# Patient Record
Sex: MALE | Race: WHITE | NOT HISPANIC OR LATINO | Employment: FULL TIME | URBAN - METROPOLITAN AREA
[De-identification: names, ages, dates, MRNs, and addresses within clinical notes are randomized per-mention and may not be internally consistent; named-entity substitution may affect disease eponyms.]

---

## 2017-03-16 ENCOUNTER — OFFICE VISIT (OUTPATIENT)
Dept: FAMILY MEDICINE CLINIC | Facility: CLINIC | Age: 27
End: 2017-03-16

## 2017-03-16 VITALS
TEMPERATURE: 98.8 F | BODY MASS INDEX: 27.78 KG/M2 | HEART RATE: 76 BPM | DIASTOLIC BLOOD PRESSURE: 75 MMHG | HEIGHT: 67 IN | RESPIRATION RATE: 16 BRPM | SYSTOLIC BLOOD PRESSURE: 129 MMHG | WEIGHT: 177 LBS

## 2017-03-16 DIAGNOSIS — Z83.79 FAMILY HISTORY OF ULCERATIVE COLITIS: ICD-10-CM

## 2017-03-16 DIAGNOSIS — Z87.19 HISTORY OF RECTAL BLEEDING: ICD-10-CM

## 2017-03-16 DIAGNOSIS — R10.11 RIGHT UPPER QUADRANT ABDOMINAL PAIN: Primary | ICD-10-CM

## 2017-03-16 DIAGNOSIS — Z13.6 SCREENING FOR ISCHEMIC HEART DISEASE: ICD-10-CM

## 2017-03-16 PROCEDURE — 99214 OFFICE O/P EST MOD 30 MIN: CPT | Performed by: FAMILY MEDICINE

## 2017-03-16 NOTE — PROGRESS NOTES
"Chief Complaint   Patient presents with   • GI Problem   • Hemorrhoids       Subjective   Patient has bad feelings 20 minutes after eating.  Pain occurs along the right upper abdominal quadrant after eating. Pain is like a poking and can be intense at times.  Almost any food causes this symptom.  This symptom has been present for the last year on an intermittent basis.  Symptoms have been worse over the past month.    He also has been having intermittent episodes of bleeding from presumed hemorrhoids.  Referral to gastroenterology is indicated.    Review of Systems   Constitutional: Negative for unexpected weight change.        Chews ice   Gastrointestinal:        See hpi       Objective   Visit Vitals   • /75   • Pulse 76   • Temp 98.8 °F (37.1 °C) (Oral)   • Resp 16   • Ht 66.5\" (168.9 cm)   • Wt 177 lb (80.3 kg)   • BMI 28.14 kg/m2     Body mass index is 28.14 kg/(m^2).  Physical Exam   Constitutional: Vital signs are normal. He appears well-developed. No distress.   Abdominal: Soft. Normal appearance and bowel sounds are normal. There is no hepatosplenomegaly. There is tenderness (mild) in the right upper quadrant. There is no rigidity, no rebound and no guarding.   Psychiatric: He has a normal mood and affect. His behavior is normal. He is attentive.       Assessment/Plan     Problem List Items Addressed This Visit        Nervous and Auditory    Right upper quadrant abdominal pain - Primary    Relevant Orders    Comprehensive metabolic panel    CBC and Differential    TSH    US Gallbladder       Other    History of rectal bleeding    Relevant Orders    Ambulatory Referral to Gastroenterology    Family history of ulcerative colitis    Relevant Orders    Ambulatory Referral to Gastroenterology      Other Visit Diagnoses     Screening for ischemic heart disease        Relevant Orders    Lipid Panel With LDL/HDL Ratio          No outpatient encounter prescriptions on file as of 3/16/2017.     No " facility-administered encounter medications on file as of 3/16/2017.        Orders Placed This Encounter   Procedures   • US Gallbladder     Standing Status:   Future     Standing Expiration Date:   9/12/2017     Order Specific Question:   Reason for Exam:     Answer:   intermittant nausea and lightheadedness   • Comprehensive metabolic panel   • Lipid Panel With LDL/HDL Ratio   • TSH   • Ambulatory Referral to Gastroenterology     Referral Priority:   Routine     Referral Type:   Consultation     Referral Reason:   Specialty Services Required     Referred to Provider:   Roverto Valdez MD     Requested Specialty:   Gastroenterology     Number of Visits Requested:   1       Continue with current treatment plan.         RTC as needed or sooner if symptoms worsen or change

## 2017-03-20 LAB
ALBUMIN SERPL-MCNC: 4.6 G/DL (ref 3.5–5.2)
ALBUMIN/GLOB SERPL: 2.1 G/DL
ALP SERPL-CCNC: 141 U/L (ref 39–117)
ALT SERPL-CCNC: 18 U/L (ref 1–41)
AST SERPL-CCNC: 17 U/L (ref 1–40)
BASOPHILS # BLD AUTO: 0.1 10*3/MM3 (ref 0–0.2)
BASOPHILS NFR BLD AUTO: 1.5 % (ref 0–1.5)
BILIRUB SERPL-MCNC: 0.3 MG/DL (ref 0.1–1.2)
BUN SERPL-MCNC: 12 MG/DL (ref 6–20)
BUN/CREAT SERPL: 13.5 (ref 7–25)
CALCIUM SERPL-MCNC: 9.4 MG/DL (ref 8.6–10.5)
CHLORIDE SERPL-SCNC: 102 MMOL/L (ref 98–107)
CHOLEST SERPL-MCNC: 126 MG/DL (ref 0–200)
CO2 SERPL-SCNC: 25.4 MMOL/L (ref 22–29)
CREAT SERPL-MCNC: 0.89 MG/DL (ref 0.76–1.27)
DIFFERENTIAL COMMENT: NORMAL
EOSINOPHIL # BLD AUTO: 0.25 10*3/MM3 (ref 0–0.7)
EOSINOPHIL NFR BLD AUTO: 3.8 % (ref 0.3–6.2)
ERYTHROCYTE [DISTWIDTH] IN BLOOD BY AUTOMATED COUNT: 19.2 % (ref 11.5–14.5)
GLOBULIN SER CALC-MCNC: 2.2 GM/DL
GLUCOSE SERPL-MCNC: 90 MG/DL (ref 65–99)
HCT VFR BLD AUTO: 25.6 % (ref 40.4–52.2)
HDLC SERPL-MCNC: 34 MG/DL (ref 40–60)
HGB BLD-MCNC: 7 G/DL (ref 13.7–17.6)
IMM GRANULOCYTES # BLD: 0.02 10*3/MM3 (ref 0–0.03)
IMM GRANULOCYTES NFR BLD: 0.3 % (ref 0–0.5)
LDLC SERPL CALC-MCNC: 72 MG/DL (ref 0–100)
LDLC/HDLC SERPL: 2.11 {RATIO}
LYMPHOCYTES # BLD AUTO: 1.67 10*3/MM3 (ref 0.9–4.8)
LYMPHOCYTES NFR BLD AUTO: 25.3 % (ref 19.6–45.3)
MCH RBC QN AUTO: 16.5 PG (ref 27–32.7)
MCHC RBC AUTO-ENTMCNC: 27.3 G/DL (ref 32.6–36.4)
MCV RBC AUTO: 60.5 FL (ref 79.8–96.2)
MONOCYTES # BLD AUTO: 0.47 10*3/MM3 (ref 0.2–1.2)
MONOCYTES NFR BLD AUTO: 7.1 % (ref 5–12)
NEUTROPHILS # BLD AUTO: 4.08 10*3/MM3 (ref 1.9–8.1)
NEUTROPHILS NFR BLD AUTO: 62 % (ref 42.7–76)
PLATELET # BLD AUTO: 477 10*3/MM3 (ref 140–500)
PLATELET BLD QL SMEAR: NORMAL
POTASSIUM SERPL-SCNC: 4.4 MMOL/L (ref 3.5–5.2)
PROT SERPL-MCNC: 6.8 G/DL (ref 6–8.5)
RBC # BLD AUTO: 4.23 10*6/MM3 (ref 4.6–6)
RBC MORPH BLD: NORMAL
SODIUM SERPL-SCNC: 141 MMOL/L (ref 136–145)
TRIGL SERPL-MCNC: 101 MG/DL (ref 0–150)
TSH SERPL DL<=0.005 MIU/L-ACNC: 3.04 MIU/ML (ref 0.27–4.2)
VLDLC SERPL CALC-MCNC: 20.2 MG/DL (ref 5–40)
WBC # BLD AUTO: 6.59 10*3/MM3 (ref 4.5–10.7)

## 2017-03-21 NOTE — PROGRESS NOTES
Please call the patient regarding his abnormal result. He has severe anemia. Call him and tell him to get on over to the ER today for recheck of this lab. See if we can get him into the gastroenterolgist on urgent basis.

## 2017-03-22 ENCOUNTER — APPOINTMENT (OUTPATIENT)
Dept: CT IMAGING | Facility: HOSPITAL | Age: 27
End: 2017-03-22

## 2017-03-22 ENCOUNTER — HOSPITAL ENCOUNTER (INPATIENT)
Facility: HOSPITAL | Age: 27
LOS: 6 days | Discharge: HOME | End: 2017-03-28
Attending: EMERGENCY MEDICINE | Admitting: HOSPITALIST

## 2017-03-22 DIAGNOSIS — Z83.79 FAMILY HISTORY OF ULCERATIVE COLITIS: ICD-10-CM

## 2017-03-22 DIAGNOSIS — K92.2 LOWER GI BLEED: ICD-10-CM

## 2017-03-22 DIAGNOSIS — D62 ACUTE BLOOD LOSS ANEMIA: ICD-10-CM

## 2017-03-22 DIAGNOSIS — D50.0 IRON DEFICIENCY ANEMIA DUE TO CHRONIC BLOOD LOSS: ICD-10-CM

## 2017-03-22 DIAGNOSIS — K62.5 RECTAL BLEEDING: Primary | ICD-10-CM

## 2017-03-22 LAB
ABO GROUP BLD: NORMAL
ALBUMIN SERPL-MCNC: 4.6 G/DL (ref 3.5–5.2)
ALBUMIN/GLOB SERPL: 1.8 G/DL
ALP SERPL-CCNC: 132 U/L (ref 39–117)
ALT SERPL W P-5'-P-CCNC: 19 U/L (ref 1–41)
ANION GAP SERPL CALCULATED.3IONS-SCNC: 11.8 MMOL/L
ANISOCYTOSIS BLD QL: NORMAL
AST SERPL-CCNC: 19 U/L (ref 1–40)
BASOPHILS # BLD AUTO: 0.07 10*3/MM3 (ref 0–0.2)
BASOPHILS NFR BLD AUTO: 1.1 % (ref 0–1.5)
BILIRUB SERPL-MCNC: 0.3 MG/DL (ref 0.1–1.2)
BLD GP AB SCN SERPL QL: NEGATIVE
BUN BLD-MCNC: 12 MG/DL (ref 6–20)
BUN/CREAT SERPL: 13.6 (ref 7–25)
CALCIUM SPEC-SCNC: 9.1 MG/DL (ref 8.6–10.5)
CHLORIDE SERPL-SCNC: 104 MMOL/L (ref 98–107)
CO2 SERPL-SCNC: 25.2 MMOL/L (ref 22–29)
CREAT BLD-MCNC: 0.88 MG/DL (ref 0.76–1.27)
CRP SERPL-MCNC: 0.54 MG/DL (ref 0–0.5)
D-LACTATE SERPL-SCNC: 0.9 MMOL/L (ref 0.5–2)
DACRYOCYTES BLD QL SMEAR: NORMAL
DEPRECATED RDW RBC AUTO: 39.5 FL (ref 37–54)
EOSINOPHIL # BLD AUTO: 0.24 10*3/MM3 (ref 0–0.7)
EOSINOPHIL NFR BLD AUTO: 3.7 % (ref 0.3–6.2)
ERYTHROCYTE [DISTWIDTH] IN BLOOD BY AUTOMATED COUNT: 18.7 % (ref 11.5–14.5)
ERYTHROCYTE [SEDIMENTATION RATE] IN BLOOD: 10 MM/HR (ref 0–15)
FERRITIN SERPL-MCNC: 2.15 NG/ML (ref 30–400)
GFR SERPL CREATININE-BSD FRML MDRD: 105 ML/MIN/1.73
GGT SERPL-CCNC: 15 U/L (ref 8–61)
GLOBULIN UR ELPH-MCNC: 2.5 GM/DL
GLUCOSE BLD-MCNC: 96 MG/DL (ref 65–99)
HCT VFR BLD AUTO: 25.8 % (ref 40.4–52.2)
HGB BLD-MCNC: 7.2 G/DL (ref 13.7–17.6)
HOLD SPECIMEN: NORMAL
HOLD SPECIMEN: NORMAL
HYPOCHROMIA BLD QL: NORMAL
IMM GRANULOCYTES # BLD: 0 10*3/MM3 (ref 0–0.03)
IMM GRANULOCYTES NFR BLD: 0 % (ref 0–0.5)
INR PPP: 1.14 (ref 0.9–1.1)
IRON 24H UR-MRATE: 9 MCG/DL (ref 59–158)
IRON SATN MFR SERPL: 2 % (ref 20–50)
LYMPHOCYTES # BLD AUTO: 1.9 10*3/MM3 (ref 0.9–4.8)
LYMPHOCYTES NFR BLD AUTO: 29.2 % (ref 19.6–45.3)
MCH RBC QN AUTO: 16.5 PG (ref 27–32.7)
MCHC RBC AUTO-ENTMCNC: 27.9 G/DL (ref 32.6–36.4)
MCV RBC AUTO: 59 FL (ref 79.8–96.2)
MICROCYTES BLD QL: NORMAL
MONOCYTES # BLD AUTO: 0.39 10*3/MM3 (ref 0.2–1.2)
MONOCYTES NFR BLD AUTO: 6 % (ref 5–12)
NEUTROPHILS # BLD AUTO: 3.91 10*3/MM3 (ref 1.9–8.1)
NEUTROPHILS NFR BLD AUTO: 60 % (ref 42.7–76)
NRBC BLD MANUAL-RTO: 0 /100 WBC (ref 0–0)
OVALOCYTES BLD QL SMEAR: NORMAL
PLAT MORPH BLD: NORMAL
PLATELET # BLD AUTO: 456 10*3/MM3 (ref 140–500)
PMV BLD AUTO: 9.2 FL (ref 6–12)
POTASSIUM BLD-SCNC: 4.6 MMOL/L (ref 3.5–5.2)
PROT SERPL-MCNC: 7.1 G/DL (ref 6–8.5)
PROTHROMBIN TIME: 14.2 SECONDS (ref 11.7–14.2)
RBC # BLD AUTO: 4.37 10*6/MM3 (ref 4.6–6)
RH BLD: POSITIVE
SODIUM BLD-SCNC: 141 MMOL/L (ref 136–145)
TIBC SERPL-MCNC: 508 MCG/DL (ref 298–536)
TRANSFERRIN SERPL-MCNC: 341 MG/DL (ref 200–360)
WBC MORPH BLD: NORMAL
WBC NRBC COR # BLD: 6.51 10*3/MM3 (ref 4.5–10.7)
WHOLE BLOOD HOLD SPECIMEN: NORMAL
WHOLE BLOOD HOLD SPECIMEN: NORMAL

## 2017-03-22 PROCEDURE — 87046 STOOL CULTR AEROBIC BACT EA: CPT | Performed by: NURSE PRACTITIONER

## 2017-03-22 PROCEDURE — 82977 ASSAY OF GGT: CPT | Performed by: INTERNAL MEDICINE

## 2017-03-22 PROCEDURE — 86920 COMPATIBILITY TEST SPIN: CPT

## 2017-03-22 PROCEDURE — 87045 FECES CULTURE AEROBIC BACT: CPT | Performed by: NURSE PRACTITIONER

## 2017-03-22 PROCEDURE — 85652 RBC SED RATE AUTOMATED: CPT | Performed by: NURSE PRACTITIONER

## 2017-03-22 PROCEDURE — 85014 HEMATOCRIT: CPT | Performed by: HOSPITALIST

## 2017-03-22 PROCEDURE — 83540 ASSAY OF IRON: CPT | Performed by: INTERNAL MEDICINE

## 2017-03-22 PROCEDURE — 86901 BLOOD TYPING SEROLOGIC RH(D): CPT

## 2017-03-22 PROCEDURE — 83993 ASSAY FOR CALPROTECTIN FECAL: CPT | Performed by: NURSE PRACTITIONER

## 2017-03-22 PROCEDURE — 86140 C-REACTIVE PROTEIN: CPT | Performed by: NURSE PRACTITIONER

## 2017-03-22 PROCEDURE — 86900 BLOOD TYPING SEROLOGIC ABO: CPT

## 2017-03-22 PROCEDURE — 85018 HEMOGLOBIN: CPT | Performed by: HOSPITALIST

## 2017-03-22 PROCEDURE — 74177 CT ABD & PELVIS W/CONTRAST: CPT

## 2017-03-22 PROCEDURE — 99254 IP/OBS CNSLTJ NEW/EST MOD 60: CPT | Performed by: INTERNAL MEDICINE

## 2017-03-22 PROCEDURE — 0 IOPAMIDOL 61 % SOLUTION: Performed by: HOSPITALIST

## 2017-03-22 PROCEDURE — 80053 COMPREHEN METABOLIC PANEL: CPT | Performed by: EMERGENCY MEDICINE

## 2017-03-22 PROCEDURE — 86850 RBC ANTIBODY SCREEN: CPT | Performed by: EMERGENCY MEDICINE

## 2017-03-22 PROCEDURE — 87493 C DIFF AMPLIFIED PROBE: CPT | Performed by: NURSE PRACTITIONER

## 2017-03-22 PROCEDURE — 25510000001 DIATRIZOATE MEGLUMINE & SODIUM PER 1 ML: Performed by: NURSE PRACTITIONER

## 2017-03-22 PROCEDURE — 83605 ASSAY OF LACTIC ACID: CPT | Performed by: EMERGENCY MEDICINE

## 2017-03-22 PROCEDURE — 86900 BLOOD TYPING SEROLOGIC ABO: CPT | Performed by: EMERGENCY MEDICINE

## 2017-03-22 PROCEDURE — 86901 BLOOD TYPING SEROLOGIC RH(D): CPT | Performed by: EMERGENCY MEDICINE

## 2017-03-22 PROCEDURE — P9016 RBC LEUKOCYTES REDUCED: HCPCS

## 2017-03-22 PROCEDURE — 85610 PROTHROMBIN TIME: CPT | Performed by: EMERGENCY MEDICINE

## 2017-03-22 PROCEDURE — 82728 ASSAY OF FERRITIN: CPT | Performed by: INTERNAL MEDICINE

## 2017-03-22 PROCEDURE — 85007 BL SMEAR W/DIFF WBC COUNT: CPT | Performed by: EMERGENCY MEDICINE

## 2017-03-22 PROCEDURE — 36430 TRANSFUSION BLD/BLD COMPNT: CPT

## 2017-03-22 PROCEDURE — 85025 COMPLETE CBC W/AUTO DIFF WBC: CPT | Performed by: EMERGENCY MEDICINE

## 2017-03-22 PROCEDURE — 84466 ASSAY OF TRANSFERRIN: CPT | Performed by: INTERNAL MEDICINE

## 2017-03-22 PROCEDURE — 99284 EMERGENCY DEPT VISIT MOD MDM: CPT

## 2017-03-22 PROCEDURE — 83631 LACTOFERRIN FECAL (QUANT): CPT | Performed by: NURSE PRACTITIONER

## 2017-03-22 RX ORDER — SODIUM CHLORIDE 0.9 % (FLUSH) 0.9 %
10 SYRINGE (ML) INJECTION AS NEEDED
Status: DISCONTINUED | OUTPATIENT
Start: 2017-03-22 | End: 2017-03-26

## 2017-03-22 RX ORDER — SODIUM CHLORIDE 0.9 % (FLUSH) 0.9 %
1-10 SYRINGE (ML) INJECTION AS NEEDED
Status: DISCONTINUED | OUTPATIENT
Start: 2017-03-22 | End: 2017-03-23

## 2017-03-22 RX ORDER — SODIUM CHLORIDE 0.9 % (FLUSH) 0.9 %
1-10 SYRINGE (ML) INJECTION AS NEEDED
Status: DISCONTINUED | OUTPATIENT
Start: 2017-03-22 | End: 2017-03-26

## 2017-03-22 RX ORDER — POLYETHYLENE GLYCOL 3350, SODIUM CHLORIDE, POTASSIUM CHLORIDE, SODIUM BICARBONATE, AND SODIUM SULFATE 240; 5.84; 2.98; 6.72; 22.72 G/4L; G/4L; G/4L; G/4L; G/4L
4000 POWDER, FOR SOLUTION ORAL EVERY 8 HOURS SCHEDULED
Status: COMPLETED | OUTPATIENT
Start: 2017-03-22 | End: 2017-03-23

## 2017-03-22 RX ORDER — SODIUM CHLORIDE 9 MG/ML
125 INJECTION, SOLUTION INTRAVENOUS CONTINUOUS
Status: DISCONTINUED | OUTPATIENT
Start: 2017-03-22 | End: 2017-03-25

## 2017-03-22 RX ADMIN — POLYETHYLENE GLYCOL 3350, SODIUM CHLORIDE, POTASSIUM CHLORIDE, SODIUM BICARBONATE, AND SODIUM SULFATE 4000 ML: 240; 5.84; 2.98; 6.72; 22.72 POWDER, FOR SOLUTION ORAL at 18:09

## 2017-03-22 RX ADMIN — POLYETHYLENE GLYCOL 3350, SODIUM CHLORIDE, POTASSIUM CHLORIDE, SODIUM BICARBONATE, AND SODIUM SULFATE 4000 ML: 240; 5.84; 2.98; 6.72; 22.72 POWDER, FOR SOLUTION ORAL at 23:34

## 2017-03-22 RX ADMIN — SODIUM CHLORIDE 125 ML/HR: 9 INJECTION, SOLUTION INTRAVENOUS at 12:44

## 2017-03-22 RX ADMIN — IOPAMIDOL 85 ML: 612 INJECTION, SOLUTION INTRAVENOUS at 17:30

## 2017-03-22 RX ADMIN — DIATRIZOATE MEGLUMINE AND DIATRIZOATE SODIUM 30 ML: 600; 100 SOLUTION ORAL; RECTAL at 15:38

## 2017-03-22 RX ADMIN — SODIUM CHLORIDE 1000 ML: 9 INJECTION, SOLUTION INTRAVENOUS at 10:21

## 2017-03-23 ENCOUNTER — ANESTHESIA (OUTPATIENT)
Dept: GASTROENTEROLOGY | Facility: HOSPITAL | Age: 27
End: 2017-03-23

## 2017-03-23 ENCOUNTER — ANESTHESIA EVENT (OUTPATIENT)
Dept: GASTROENTEROLOGY | Facility: HOSPITAL | Age: 27
End: 2017-03-23

## 2017-03-23 PROBLEM — K64.4 EXTERNAL HEMORRHOID: Status: ACTIVE | Noted: 2017-03-23

## 2017-03-23 PROBLEM — D50.9 IDA (IRON DEFICIENCY ANEMIA): Status: ACTIVE | Noted: 2017-03-23

## 2017-03-23 LAB
ANION GAP SERPL CALCULATED.3IONS-SCNC: 14.7 MMOL/L
ANISOCYTOSIS BLD QL: NORMAL
BASOPHILS # BLD AUTO: 0.09 10*3/MM3 (ref 0–0.2)
BASOPHILS NFR BLD AUTO: 1.1 % (ref 0–1.5)
BUN BLD-MCNC: 9 MG/DL (ref 6–20)
BUN/CREAT SERPL: 9.8 (ref 7–25)
C DIFF TOX GENS STL QL NAA+PROBE: NEGATIVE
CALCIUM SPEC-SCNC: 8.8 MG/DL (ref 8.6–10.5)
CHLORIDE SERPL-SCNC: 103 MMOL/L (ref 98–107)
CO2 SERPL-SCNC: 22.3 MMOL/L (ref 22–29)
CREAT BLD-MCNC: 0.92 MG/DL (ref 0.76–1.27)
DEPRECATED RDW RBC AUTO: 45.8 FL (ref 37–54)
ELLIPTOCYTES BLD QL SMEAR: NORMAL
EOSINOPHIL # BLD AUTO: 0.19 10*3/MM3 (ref 0–0.7)
EOSINOPHIL NFR BLD AUTO: 2.3 % (ref 0.3–6.2)
ERYTHROCYTE [DISTWIDTH] IN BLOOD BY AUTOMATED COUNT: 21 % (ref 11.5–14.5)
GFR SERPL CREATININE-BSD FRML MDRD: 99 ML/MIN/1.73
GLUCOSE BLD-MCNC: 93 MG/DL (ref 65–99)
HCT VFR BLD AUTO: 25.9 % (ref 40.4–52.2)
HCT VFR BLD AUTO: 26.9 % (ref 40.4–52.2)
HCT VFR BLD AUTO: 27.1 % (ref 40.4–52.2)
HGB BLD-MCNC: 7.5 G/DL (ref 13.7–17.6)
HGB BLD-MCNC: 7.7 G/DL (ref 13.7–17.6)
HGB BLD-MCNC: 7.8 G/DL (ref 13.7–17.6)
HYPOCHROMIA BLD QL: NORMAL
IMM GRANULOCYTES # BLD: 0.02 10*3/MM3 (ref 0–0.03)
IMM GRANULOCYTES NFR BLD: 0.2 % (ref 0–0.5)
INR PPP: 1.2 (ref 0.9–1.1)
LACTOFERRIN STL QL LA: NEGATIVE
LYMPHOCYTES # BLD AUTO: 2.58 10*3/MM3 (ref 0.9–4.8)
LYMPHOCYTES NFR BLD AUTO: 31.2 % (ref 19.6–45.3)
MCH RBC QN AUTO: 17.4 PG (ref 27–32.7)
MCHC RBC AUTO-ENTMCNC: 29 G/DL (ref 32.6–36.4)
MCV RBC AUTO: 60.1 FL (ref 79.8–96.2)
MONOCYTES # BLD AUTO: 0.61 10*3/MM3 (ref 0.2–1.2)
MONOCYTES NFR BLD AUTO: 7.4 % (ref 5–12)
NEUTROPHILS # BLD AUTO: 4.79 10*3/MM3 (ref 1.9–8.1)
NEUTROPHILS NFR BLD AUTO: 57.8 % (ref 42.7–76)
NRBC BLD MANUAL-RTO: 0 /100 WBC (ref 0–0)
PLAT MORPH BLD: NORMAL
PLATELET # BLD AUTO: 439 10*3/MM3 (ref 140–500)
PMV BLD AUTO: 9 FL (ref 6–12)
POTASSIUM BLD-SCNC: 4 MMOL/L (ref 3.5–5.2)
PROTHROMBIN TIME: 14.7 SECONDS (ref 11.7–14.2)
RBC # BLD AUTO: 4.31 10*6/MM3 (ref 4.6–6)
SODIUM BLD-SCNC: 140 MMOL/L (ref 136–145)
SPHEROCYTES BLD QL SMEAR: NORMAL
STOMATOCYTES BLD QL SMEAR: NORMAL
WBC MORPH BLD: NORMAL
WBC NRBC COR # BLD: 8.28 10*3/MM3 (ref 4.5–10.7)

## 2017-03-23 PROCEDURE — 85610 PROTHROMBIN TIME: CPT | Performed by: NURSE PRACTITIONER

## 2017-03-23 PROCEDURE — 0DB98ZX EXCISION OF DUODENUM, VIA NATURAL OR ARTIFICIAL OPENING ENDOSCOPIC, DIAGNOSTIC: ICD-10-PCS | Performed by: INTERNAL MEDICINE

## 2017-03-23 PROCEDURE — 85014 HEMATOCRIT: CPT | Performed by: INTERNAL MEDICINE

## 2017-03-23 PROCEDURE — 25010000002 IRON SUCROSE PER 1 MG: Performed by: HOSPITALIST

## 2017-03-23 PROCEDURE — 45380 COLONOSCOPY AND BIOPSY: CPT | Performed by: INTERNAL MEDICINE

## 2017-03-23 PROCEDURE — 0DBB8ZX EXCISION OF ILEUM, VIA NATURAL OR ARTIFICIAL OPENING ENDOSCOPIC, DIAGNOSTIC: ICD-10-PCS | Performed by: INTERNAL MEDICINE

## 2017-03-23 PROCEDURE — 85018 HEMOGLOBIN: CPT | Performed by: INTERNAL MEDICINE

## 2017-03-23 PROCEDURE — 80048 BASIC METABOLIC PNL TOTAL CA: CPT | Performed by: NURSE PRACTITIONER

## 2017-03-23 PROCEDURE — 36430 TRANSFUSION BLD/BLD COMPNT: CPT

## 2017-03-23 PROCEDURE — 85007 BL SMEAR W/DIFF WBC COUNT: CPT | Performed by: NURSE PRACTITIONER

## 2017-03-23 PROCEDURE — 0DBF8ZX EXCISION OF RIGHT LARGE INTESTINE, VIA NATURAL OR ARTIFICIAL OPENING ENDOSCOPIC, DIAGNOSTIC: ICD-10-PCS | Performed by: INTERNAL MEDICINE

## 2017-03-23 PROCEDURE — 86900 BLOOD TYPING SEROLOGIC ABO: CPT

## 2017-03-23 PROCEDURE — P9016 RBC LEUKOCYTES REDUCED: HCPCS

## 2017-03-23 PROCEDURE — 0DBG8ZX EXCISION OF LEFT LARGE INTESTINE, VIA NATURAL OR ARTIFICIAL OPENING ENDOSCOPIC, DIAGNOSTIC: ICD-10-PCS | Performed by: INTERNAL MEDICINE

## 2017-03-23 PROCEDURE — 36415 COLL VENOUS BLD VENIPUNCTURE: CPT | Performed by: NURSE PRACTITIONER

## 2017-03-23 PROCEDURE — 25010000002 PROPOFOL 1000 MG/ML EMULSION: Performed by: ANESTHESIOLOGY

## 2017-03-23 PROCEDURE — 88305 TISSUE EXAM BY PATHOLOGIST: CPT | Performed by: INTERNAL MEDICINE

## 2017-03-23 PROCEDURE — 85025 COMPLETE CBC W/AUTO DIFF WBC: CPT | Performed by: NURSE PRACTITIONER

## 2017-03-23 PROCEDURE — 25010000002 PROPOFOL 10 MG/ML EMULSION: Performed by: ANESTHESIOLOGY

## 2017-03-23 RX ORDER — ACETAMINOPHEN 325 MG/1
650 TABLET ORAL EVERY 4 HOURS PRN
Status: DISCONTINUED | OUTPATIENT
Start: 2017-03-23 | End: 2017-03-26

## 2017-03-23 RX ORDER — SODIUM CHLORIDE, SODIUM LACTATE, POTASSIUM CHLORIDE, CALCIUM CHLORIDE 600; 310; 30; 20 MG/100ML; MG/100ML; MG/100ML; MG/100ML
30 INJECTION, SOLUTION INTRAVENOUS CONTINUOUS
Status: DISCONTINUED | OUTPATIENT
Start: 2017-03-23 | End: 2017-03-25

## 2017-03-23 RX ORDER — PROPOFOL 10 MG/ML
VIAL (ML) INTRAVENOUS AS NEEDED
Status: DISCONTINUED | OUTPATIENT
Start: 2017-03-23 | End: 2017-03-23 | Stop reason: SURG

## 2017-03-23 RX ADMIN — PROPOFOL 100 MG: 10 INJECTION, EMULSION INTRAVENOUS at 11:41

## 2017-03-23 RX ADMIN — PROPOFOL 50 MG: 10 INJECTION, EMULSION INTRAVENOUS at 11:42

## 2017-03-23 RX ADMIN — IRON SUCROSE 200 MG: 20 INJECTION, SOLUTION INTRAVENOUS at 21:31

## 2017-03-23 RX ADMIN — PROPOFOL 50 MG: 10 INJECTION, EMULSION INTRAVENOUS at 11:47

## 2017-03-23 RX ADMIN — SODIUM CHLORIDE, POTASSIUM CHLORIDE, SODIUM LACTATE AND CALCIUM CHLORIDE 30 ML/HR: 600; 310; 30; 20 INJECTION, SOLUTION INTRAVENOUS at 10:19

## 2017-03-23 RX ADMIN — POLYETHYLENE GLYCOL 3350, SODIUM CHLORIDE, POTASSIUM CHLORIDE, SODIUM BICARBONATE, AND SODIUM SULFATE 4000 ML: 240; 5.84; 2.98; 6.72; 22.72 POWDER, FOR SOLUTION ORAL at 06:50

## 2017-03-23 RX ADMIN — PROPOFOL 120 MCG/KG/MIN: 10 INJECTION, EMULSION INTRAVENOUS at 11:41

## 2017-03-23 RX ADMIN — PROPOFOL 50 MG: 10 INJECTION, EMULSION INTRAVENOUS at 11:49

## 2017-03-23 NOTE — ANESTHESIA POSTPROCEDURE EVALUATION
Patient: Hema Sweet    Procedure Summary     Date Anesthesia Start Anesthesia Stop Room / Location    03/23/17 1137 1204  [facility]       Procedure Diagnosis Surgeon Provider    COLONOSCOPY TO CECUM AND INTO TERMINAL ILEUM WITH COLD BIOPSIES (N/A ) Acute blood loss anemia; Rectal bleeding; Family history of ulcerative colitis  (Acute blood loss anemia [D62]; Rectal bleeding [K62.5]; Family history of ulcerative colitis [Z83.79]) MD Bashir Gallo MD          Anesthesia Type: MAC  Last vitals  /67 (03/23/17 1225)    Temp      Pulse 83 (03/23/17 1225)   Resp 16 (03/23/17 1225)    SpO2 99 % (03/23/17 1225)      Post Anesthesia Care and Evaluation    Patient location during evaluation: bedside  Level of consciousness: awake  Pain score: 1  Pain management: adequate  Airway patency: patent  Anesthetic complications: No anesthetic complications    Cardiovascular status: acceptable  Respiratory status: acceptable  Hydration status: acceptable

## 2017-03-23 NOTE — ANESTHESIA PREPROCEDURE EVALUATION
Anesthesia Evaluation     Patient summary reviewed and Nursing notes reviewed   no history of anesthetic complications:     Airway   Mallampati: II  TM distance: >3 FB  Neck ROM: full  no difficulty expected  Dental - normal exam     Pulmonary - negative pulmonary ROS    breath sounds clear to auscultation  (-) shortness of breath, sleep apnea, decreased breath sounds, wheezes  Cardiovascular - normal exam  Exercise tolerance: good (4-7 METS)    Rhythm: regular  Rate: normal    (-) past MI, angina, CHF, orthopnea, PND, GONCALVES, PVD      Neuro/Psych  (+) neuromuscular disease (hx of Bell's Palsy),    (-) seizures, TIA, CVA, dizziness/light headedness, weakness, numbness  GI/Hepatic/Renal/Endo - negative ROS   (-) liver disease, renal disease, diabetes    Musculoskeletal (-) negative ROS    Abdominal  - normal exam   Substance History - negative use  (-) alcohol use, drug use     OB/GYN negative ob/gyn ROS         Other - negative ROS                                   Anesthesia Plan    ASA 1     MAC     intravenous induction   Anesthetic plan and risks discussed with patient.

## 2017-03-24 ENCOUNTER — ANESTHESIA EVENT (OUTPATIENT)
Dept: GASTROENTEROLOGY | Facility: HOSPITAL | Age: 27
End: 2017-03-24

## 2017-03-24 ENCOUNTER — APPOINTMENT (OUTPATIENT)
Dept: ULTRASOUND IMAGING | Facility: HOSPITAL | Age: 27
End: 2017-03-24
Attending: FAMILY MEDICINE

## 2017-03-24 ENCOUNTER — APPOINTMENT (OUTPATIENT)
Dept: ULTRASOUND IMAGING | Facility: HOSPITAL | Age: 27
End: 2017-03-24

## 2017-03-24 ENCOUNTER — ANESTHESIA (OUTPATIENT)
Dept: GASTROENTEROLOGY | Facility: HOSPITAL | Age: 27
End: 2017-03-24

## 2017-03-24 LAB
ABO + RH BLD: NORMAL
ABO + RH BLD: NORMAL
ANION GAP SERPL CALCULATED.3IONS-SCNC: 16 MMOL/L
BASOPHILS # BLD AUTO: 0.05 10*3/MM3 (ref 0–0.2)
BASOPHILS NFR BLD AUTO: 0.5 % (ref 0–1.5)
BH BB BLOOD EXPIRATION DATE: NORMAL
BH BB BLOOD EXPIRATION DATE: NORMAL
BH BB BLOOD TYPE BARCODE: 5100
BH BB BLOOD TYPE BARCODE: 5100
BH BB DISPENSE STATUS: NORMAL
BH BB DISPENSE STATUS: NORMAL
BH BB PRODUCT CODE: NORMAL
BH BB PRODUCT CODE: NORMAL
BH BB UNIT NUMBER: NORMAL
BH BB UNIT NUMBER: NORMAL
BUN BLD-MCNC: 10 MG/DL (ref 6–20)
BUN/CREAT SERPL: 11.1 (ref 7–25)
CALCIUM SPEC-SCNC: 8.8 MG/DL (ref 8.6–10.5)
CHLORIDE SERPL-SCNC: 102 MMOL/L (ref 98–107)
CO2 SERPL-SCNC: 22 MMOL/L (ref 22–29)
CREAT BLD-MCNC: 0.9 MG/DL (ref 0.76–1.27)
CROSSMATCH INTERPRETATION: NORMAL
CROSSMATCH INTERPRETATION: NORMAL
CYTO UR: NORMAL
DEPRECATED RDW RBC AUTO: 51.4 FL (ref 37–54)
EOSINOPHIL # BLD AUTO: 0.22 10*3/MM3 (ref 0–0.7)
EOSINOPHIL NFR BLD AUTO: 2.4 % (ref 0.3–6.2)
ERYTHROCYTE [DISTWIDTH] IN BLOOD BY AUTOMATED COUNT: 22.9 % (ref 11.5–14.5)
GFR SERPL CREATININE-BSD FRML MDRD: 102 ML/MIN/1.73
GLUCOSE BLD-MCNC: 96 MG/DL (ref 65–99)
HCT VFR BLD AUTO: 28.4 % (ref 40.4–52.2)
HGB BLD-MCNC: 8.4 G/DL (ref 13.7–17.6)
IMM GRANULOCYTES # BLD: 0.03 10*3/MM3 (ref 0–0.03)
IMM GRANULOCYTES NFR BLD: 0.3 % (ref 0–0.5)
LAB AP CASE REPORT: NORMAL
LYMPHOCYTES # BLD AUTO: 1.96 10*3/MM3 (ref 0.9–4.8)
LYMPHOCYTES NFR BLD AUTO: 21.3 % (ref 19.6–45.3)
Lab: NORMAL
MCH RBC QN AUTO: 18.6 PG (ref 27–32.7)
MCHC RBC AUTO-ENTMCNC: 29.6 G/DL (ref 32.6–36.4)
MCV RBC AUTO: 62.8 FL (ref 79.8–96.2)
MONOCYTES # BLD AUTO: 0.77 10*3/MM3 (ref 0.2–1.2)
MONOCYTES NFR BLD AUTO: 8.4 % (ref 5–12)
NEUTROPHILS # BLD AUTO: 6.19 10*3/MM3 (ref 1.9–8.1)
NEUTROPHILS NFR BLD AUTO: 67.1 % (ref 42.7–76)
PATH REPORT.FINAL DX SPEC: NORMAL
PATH REPORT.GROSS SPEC: NORMAL
PLATELET # BLD AUTO: 426 10*3/MM3 (ref 140–500)
PMV BLD AUTO: 8.8 FL (ref 6–12)
POTASSIUM BLD-SCNC: 3.9 MMOL/L (ref 3.5–5.2)
RBC # BLD AUTO: 4.52 10*6/MM3 (ref 4.6–6)
SODIUM BLD-SCNC: 140 MMOL/L (ref 136–145)
UNIT  ABO: NORMAL
UNIT  ABO: NORMAL
UNIT  RH: NORMAL
UNIT  RH: NORMAL
WBC NRBC COR # BLD: 9.22 10*3/MM3 (ref 4.5–10.7)

## 2017-03-24 PROCEDURE — 76705 ECHO EXAM OF ABDOMEN: CPT

## 2017-03-24 PROCEDURE — 43239 EGD BIOPSY SINGLE/MULTIPLE: CPT | Performed by: INTERNAL MEDICINE

## 2017-03-24 PROCEDURE — 85025 COMPLETE CBC W/AUTO DIFF WBC: CPT | Performed by: HOSPITALIST

## 2017-03-24 PROCEDURE — 88312 SPECIAL STAINS GROUP 1: CPT | Performed by: INTERNAL MEDICINE

## 2017-03-24 PROCEDURE — 80048 BASIC METABOLIC PNL TOTAL CA: CPT | Performed by: HOSPITALIST

## 2017-03-24 PROCEDURE — 25010000002 IRON SUCROSE PER 1 MG: Performed by: HOSPITALIST

## 2017-03-24 PROCEDURE — 0DJD8ZZ INSPECTION OF LOWER INTESTINAL TRACT, VIA NATURAL OR ARTIFICIAL OPENING ENDOSCOPIC: ICD-10-PCS | Performed by: INTERNAL MEDICINE

## 2017-03-24 PROCEDURE — 45331 SIGMOIDOSCOPY AND BIOPSY: CPT | Performed by: INTERNAL MEDICINE

## 2017-03-24 PROCEDURE — 0DB68ZX EXCISION OF STOMACH, VIA NATURAL OR ARTIFICIAL OPENING ENDOSCOPIC, DIAGNOSTIC: ICD-10-PCS | Performed by: INTERNAL MEDICINE

## 2017-03-24 PROCEDURE — 25010000002 PROPOFOL 10 MG/ML EMULSION: Performed by: ANESTHESIOLOGY

## 2017-03-24 PROCEDURE — 88305 TISSUE EXAM BY PATHOLOGIST: CPT | Performed by: INTERNAL MEDICINE

## 2017-03-24 RX ORDER — PROPOFOL 10 MG/ML
VIAL (ML) INTRAVENOUS AS NEEDED
Status: DISCONTINUED | OUTPATIENT
Start: 2017-03-24 | End: 2017-03-24 | Stop reason: SURG

## 2017-03-24 RX ORDER — LIDOCAINE HYDROCHLORIDE 20 MG/ML
INJECTION, SOLUTION INFILTRATION; PERINEURAL AS NEEDED
Status: DISCONTINUED | OUTPATIENT
Start: 2017-03-24 | End: 2017-03-24 | Stop reason: SURG

## 2017-03-24 RX ORDER — SODIUM CHLORIDE 0.9 % (FLUSH) 0.9 %
1-10 SYRINGE (ML) INJECTION AS NEEDED
Status: DISCONTINUED | OUTPATIENT
Start: 2017-03-24 | End: 2017-03-24

## 2017-03-24 RX ORDER — HYDROCORTISONE ACETATE 25 MG/1
25 SUPPOSITORY RECTAL 2 TIMES DAILY
Status: DISCONTINUED | OUTPATIENT
Start: 2017-03-24 | End: 2017-03-25

## 2017-03-24 RX ORDER — SODIUM CHLORIDE, SODIUM LACTATE, POTASSIUM CHLORIDE, CALCIUM CHLORIDE 600; 310; 30; 20 MG/100ML; MG/100ML; MG/100ML; MG/100ML
30 INJECTION, SOLUTION INTRAVENOUS CONTINUOUS PRN
Status: DISCONTINUED | OUTPATIENT
Start: 2017-03-24 | End: 2017-03-28 | Stop reason: HOSPADM

## 2017-03-24 RX ADMIN — SODIUM CHLORIDE, POTASSIUM CHLORIDE, SODIUM LACTATE AND CALCIUM CHLORIDE: 600; 310; 30; 20 INJECTION, SOLUTION INTRAVENOUS at 10:04

## 2017-03-24 RX ADMIN — IRON SUCROSE 200 MG: 20 INJECTION, SOLUTION INTRAVENOUS at 13:52

## 2017-03-24 RX ADMIN — LIDOCAINE HYDROCHLORIDE 100 MG: 20 INJECTION, SOLUTION INFILTRATION; PERINEURAL at 10:05

## 2017-03-24 RX ADMIN — PROPOFOL 150 MG: 10 INJECTION, EMULSION INTRAVENOUS at 10:05

## 2017-03-24 RX ADMIN — ACETAMINOPHEN 650 MG: 325 TABLET ORAL at 22:56

## 2017-03-24 RX ADMIN — HYDROCORTISONE ACETATE 25 MG: 25 SUPPOSITORY RECTAL at 20:30

## 2017-03-24 RX ADMIN — HYDROCORTISONE ACETATE 25 MG: 25 SUPPOSITORY RECTAL at 15:27

## 2017-03-24 RX ADMIN — SODIUM CHLORIDE, POTASSIUM CHLORIDE, SODIUM LACTATE AND CALCIUM CHLORIDE 30 ML/HR: 600; 310; 30; 20 INJECTION, SOLUTION INTRAVENOUS at 09:45

## 2017-03-24 NOTE — ANESTHESIA PREPROCEDURE EVALUATION
Anesthesia Evaluation     Patient summary reviewed and Nursing notes reviewed   NPO Status: > 8 hours   Airway   Mallampati: II  Neck ROM: full  no difficulty expected  Dental - normal exam     Pulmonary - negative pulmonary ROS    breath sounds clear to auscultation  Cardiovascular - negative cardio ROS    Rhythm: regular        Neuro/Psych- negative ROS  GI/Hepatic/Renal/Endo - negative ROS     Musculoskeletal (-) negative ROS    Abdominal    Substance History - negative use     OB/GYN negative ob/gyn ROS         Other                                    Anesthesia Plan    ASA 2     MAC     intravenous induction   Anesthetic plan and risks discussed with patient.

## 2017-03-25 ENCOUNTER — ANESTHESIA EVENT (OUTPATIENT)
Dept: PERIOP | Facility: HOSPITAL | Age: 27
End: 2017-03-25

## 2017-03-25 LAB
ANION GAP SERPL CALCULATED.3IONS-SCNC: 13.6 MMOL/L
BACTERIA STL CULT: NORMAL
BACTERIA STL CULT: NORMAL
BASOPHILS # BLD AUTO: 0.07 10*3/MM3 (ref 0–0.2)
BASOPHILS NFR BLD AUTO: 0.7 % (ref 0–1.5)
BUN BLD-MCNC: 12 MG/DL (ref 6–20)
BUN/CREAT SERPL: 13.3 (ref 7–25)
CALCIUM SPEC-SCNC: 9 MG/DL (ref 8.6–10.5)
CHLORIDE SERPL-SCNC: 103 MMOL/L (ref 98–107)
CO2 SERPL-SCNC: 21.4 MMOL/L (ref 22–29)
CREAT BLD-MCNC: 0.9 MG/DL (ref 0.76–1.27)
CYTO UR: NORMAL
DEPRECATED RDW RBC AUTO: 51.7 FL (ref 37–54)
EOSINOPHIL # BLD AUTO: 0.22 10*3/MM3 (ref 0–0.7)
EOSINOPHIL NFR BLD AUTO: 2.3 % (ref 0.3–6.2)
ERYTHROCYTE [DISTWIDTH] IN BLOOD BY AUTOMATED COUNT: 23.1 % (ref 11.5–14.5)
GFR SERPL CREATININE-BSD FRML MDRD: 102 ML/MIN/1.73
GLUCOSE BLD-MCNC: 105 MG/DL (ref 65–99)
HCT VFR BLD AUTO: 28.2 % (ref 40.4–52.2)
HGB BLD-MCNC: 8.4 G/DL (ref 13.7–17.6)
IMM GRANULOCYTES # BLD: 0.12 10*3/MM3 (ref 0–0.03)
IMM GRANULOCYTES NFR BLD: 1.2 % (ref 0–0.5)
LAB AP CASE REPORT: NORMAL
LYMPHOCYTES # BLD AUTO: 2.3 10*3/MM3 (ref 0.9–4.8)
LYMPHOCYTES NFR BLD AUTO: 23.7 % (ref 19.6–45.3)
Lab: NORMAL
MCH RBC QN AUTO: 18.9 PG (ref 27–32.7)
MCHC RBC AUTO-ENTMCNC: 29.8 G/DL (ref 32.6–36.4)
MCV RBC AUTO: 63.4 FL (ref 79.8–96.2)
MONOCYTES # BLD AUTO: 0.76 10*3/MM3 (ref 0.2–1.2)
MONOCYTES NFR BLD AUTO: 7.8 % (ref 5–12)
NEUTROPHILS # BLD AUTO: 6.23 10*3/MM3 (ref 1.9–8.1)
NEUTROPHILS NFR BLD AUTO: 64.3 % (ref 42.7–76)
PATH REPORT.FINAL DX SPEC: NORMAL
PATH REPORT.GROSS SPEC: NORMAL
PLATELET # BLD AUTO: 436 10*3/MM3 (ref 140–500)
PMV BLD AUTO: 8.8 FL (ref 6–12)
POTASSIUM BLD-SCNC: 4.1 MMOL/L (ref 3.5–5.2)
RBC # BLD AUTO: 4.45 10*6/MM3 (ref 4.6–6)
SODIUM BLD-SCNC: 138 MMOL/L (ref 136–145)
WBC NRBC COR # BLD: 9.7 10*3/MM3 (ref 4.5–10.7)

## 2017-03-25 PROCEDURE — 85025 COMPLETE CBC W/AUTO DIFF WBC: CPT | Performed by: HOSPITALIST

## 2017-03-25 PROCEDURE — 80048 BASIC METABOLIC PNL TOTAL CA: CPT | Performed by: HOSPITALIST

## 2017-03-25 PROCEDURE — 25010000002 IRON SUCROSE PER 1 MG: Performed by: HOSPITALIST

## 2017-03-25 PROCEDURE — 99232 SBSQ HOSP IP/OBS MODERATE 35: CPT | Performed by: INTERNAL MEDICINE

## 2017-03-25 PROCEDURE — 99254 IP/OBS CNSLTJ NEW/EST MOD 60: CPT | Performed by: COLON & RECTAL SURGERY

## 2017-03-25 RX ORDER — POLYETHYLENE GLYCOL 3350 17 G/17G
17 POWDER, FOR SOLUTION ORAL ONCE
Status: COMPLETED | OUTPATIENT
Start: 2017-03-25 | End: 2017-03-25

## 2017-03-25 RX ORDER — LEVOFLOXACIN 5 MG/ML
500 INJECTION, SOLUTION INTRAVENOUS
Status: COMPLETED | OUTPATIENT
Start: 2017-03-25 | End: 2017-03-26

## 2017-03-25 RX ADMIN — POLYETHYLENE GLYCOL 3350 17 G: 17 POWDER, FOR SOLUTION ORAL at 18:24

## 2017-03-25 RX ADMIN — HYDROCORTISONE ACETATE 25 MG: 25 SUPPOSITORY RECTAL at 09:16

## 2017-03-25 RX ADMIN — METRONIDAZOLE 500 MG: 500 INJECTION, SOLUTION INTRAVENOUS at 22:06

## 2017-03-25 RX ADMIN — IRON SUCROSE 200 MG: 20 INJECTION, SOLUTION INTRAVENOUS at 09:17

## 2017-03-25 RX ADMIN — POLYETHYLENE GLYCOL 3350 17 G: 17 POWDER, FOR SOLUTION ORAL at 21:14

## 2017-03-26 ENCOUNTER — ANESTHESIA (OUTPATIENT)
Dept: PERIOP | Facility: HOSPITAL | Age: 27
End: 2017-03-26

## 2017-03-26 LAB
ANION GAP SERPL CALCULATED.3IONS-SCNC: 14.2 MMOL/L
BASOPHILS # BLD AUTO: 0.08 10*3/MM3 (ref 0–0.2)
BASOPHILS NFR BLD AUTO: 0.7 % (ref 0–1.5)
BUN BLD-MCNC: 9 MG/DL (ref 6–20)
BUN/CREAT SERPL: 9.7 (ref 7–25)
CALCIUM SPEC-SCNC: 9.2 MG/DL (ref 8.6–10.5)
CHLORIDE SERPL-SCNC: 103 MMOL/L (ref 98–107)
CO2 SERPL-SCNC: 23.8 MMOL/L (ref 22–29)
CREAT BLD-MCNC: 0.93 MG/DL (ref 0.76–1.27)
DEPRECATED RDW RBC AUTO: 54.8 FL (ref 37–54)
EOSINOPHIL # BLD AUTO: 0.3 10*3/MM3 (ref 0–0.7)
EOSINOPHIL NFR BLD AUTO: 2.7 % (ref 0.3–6.2)
ERYTHROCYTE [DISTWIDTH] IN BLOOD BY AUTOMATED COUNT: 25.3 % (ref 11.5–14.5)
GFR SERPL CREATININE-BSD FRML MDRD: 98 ML/MIN/1.73
GLUCOSE BLD-MCNC: 101 MG/DL (ref 65–99)
HCT VFR BLD AUTO: 30.4 % (ref 40.4–52.2)
HGB BLD-MCNC: 8.9 G/DL (ref 13.7–17.6)
IMM GRANULOCYTES # BLD: 0.18 10*3/MM3 (ref 0–0.03)
IMM GRANULOCYTES NFR BLD: 1.6 % (ref 0–0.5)
LYMPHOCYTES # BLD AUTO: 2.59 10*3/MM3 (ref 0.9–4.8)
LYMPHOCYTES NFR BLD AUTO: 22.9 % (ref 19.6–45.3)
MCH RBC QN AUTO: 19.4 PG (ref 27–32.7)
MCHC RBC AUTO-ENTMCNC: 29.3 G/DL (ref 32.6–36.4)
MCV RBC AUTO: 66.4 FL (ref 79.8–96.2)
MONOCYTES # BLD AUTO: 0.98 10*3/MM3 (ref 0.2–1.2)
MONOCYTES NFR BLD AUTO: 8.7 % (ref 5–12)
NEUTROPHILS # BLD AUTO: 7.17 10*3/MM3 (ref 1.9–8.1)
NEUTROPHILS NFR BLD AUTO: 63.4 % (ref 42.7–76)
PLATELET # BLD AUTO: 421 10*3/MM3 (ref 140–500)
PMV BLD AUTO: 9.3 FL (ref 6–12)
POTASSIUM BLD-SCNC: 3.9 MMOL/L (ref 3.5–5.2)
RBC # BLD AUTO: 4.58 10*6/MM3 (ref 4.6–6)
SODIUM BLD-SCNC: 141 MMOL/L (ref 136–145)
WBC NRBC COR # BLD: 11.3 10*3/MM3 (ref 4.5–10.7)

## 2017-03-26 PROCEDURE — 25010000002 LEVOFLOXACIN PER 250 MG: Performed by: COLON & RECTAL SURGERY

## 2017-03-26 PROCEDURE — 63710000001 DIPHENHYDRAMINE PER 50 MG: Performed by: INTERNAL MEDICINE

## 2017-03-26 PROCEDURE — 25010000002 ONDANSETRON PER 1 MG: Performed by: ANESTHESIOLOGY

## 2017-03-26 PROCEDURE — 25010000002 MIDAZOLAM PER 1 MG: Performed by: ANESTHESIOLOGY

## 2017-03-26 PROCEDURE — 25010000002 DEXAMETHASONE PER 1 MG: Performed by: ANESTHESIOLOGY

## 2017-03-26 PROCEDURE — 25010000002 ONDANSETRON PER 1 MG: Performed by: COLON & RECTAL SURGERY

## 2017-03-26 PROCEDURE — 80048 BASIC METABOLIC PNL TOTAL CA: CPT | Performed by: HOSPITALIST

## 2017-03-26 PROCEDURE — 06BY0ZC EXCISION OF HEMORRHOIDAL PLEXUS, OPEN APPROACH: ICD-10-PCS | Performed by: COLON & RECTAL SURGERY

## 2017-03-26 PROCEDURE — 85025 COMPLETE CBC W/AUTO DIFF WBC: CPT | Performed by: INTERNAL MEDICINE

## 2017-03-26 PROCEDURE — 25010000002 FENTANYL CITRATE (PF) 100 MCG/2ML SOLUTION: Performed by: ANESTHESIOLOGY

## 2017-03-26 PROCEDURE — 83516 IMMUNOASSAY NONANTIBODY: CPT | Performed by: INTERNAL MEDICINE

## 2017-03-26 PROCEDURE — 88304 TISSUE EXAM BY PATHOLOGIST: CPT | Performed by: COLON & RECTAL SURGERY

## 2017-03-26 PROCEDURE — 25010000002 SUCCINYLCHOLINE PER 20 MG: Performed by: ANESTHESIOLOGY

## 2017-03-26 PROCEDURE — 25010000002 FENTANYL CITRATE (PF) 100 MCG/2ML SOLUTION

## 2017-03-26 PROCEDURE — 25010000002 PROPOFOL 10 MG/ML EMULSION: Performed by: ANESTHESIOLOGY

## 2017-03-26 PROCEDURE — 99231 SBSQ HOSP IP/OBS SF/LOW 25: CPT | Performed by: INTERNAL MEDICINE

## 2017-03-26 PROCEDURE — 25010000002 HYDROMORPHONE PER 4 MG: Performed by: HOSPITALIST

## 2017-03-26 PROCEDURE — 25010000002 HYDROMORPHONE PER 4 MG: Performed by: COLON & RECTAL SURGERY

## 2017-03-26 PROCEDURE — 46260 REMOVE IN/EX HEM GROUPS 2+: CPT | Performed by: COLON & RECTAL SURGERY

## 2017-03-26 PROCEDURE — 25010000002 NEOSTIGMINE 10 MG/10ML SOLUTION: Performed by: ANESTHESIOLOGY

## 2017-03-26 PROCEDURE — 25010000002 HYDROMORPHONE PER 4 MG: Performed by: ANESTHESIOLOGY

## 2017-03-26 RX ORDER — LABETALOL HYDROCHLORIDE 5 MG/ML
5 INJECTION, SOLUTION INTRAVENOUS
Status: DISCONTINUED | OUTPATIENT
Start: 2017-03-26 | End: 2017-03-26 | Stop reason: HOSPADM

## 2017-03-26 RX ORDER — ONDANSETRON 2 MG/ML
4 INJECTION INTRAMUSCULAR; INTRAVENOUS EVERY 6 HOURS PRN
Status: DISCONTINUED | OUTPATIENT
Start: 2017-03-26 | End: 2017-03-28 | Stop reason: HOSPADM

## 2017-03-26 RX ORDER — LIDOCAINE HYDROCHLORIDE 20 MG/ML
INJECTION, SOLUTION INFILTRATION; PERINEURAL AS NEEDED
Status: DISCONTINUED | OUTPATIENT
Start: 2017-03-26 | End: 2017-03-26 | Stop reason: SURG

## 2017-03-26 RX ORDER — PROMETHAZINE HYDROCHLORIDE 25 MG/1
25 TABLET ORAL ONCE AS NEEDED
Status: DISCONTINUED | OUTPATIENT
Start: 2017-03-26 | End: 2017-03-26 | Stop reason: HOSPADM

## 2017-03-26 RX ORDER — DEXAMETHASONE SODIUM PHOSPHATE 10 MG/ML
INJECTION INTRAMUSCULAR; INTRAVENOUS AS NEEDED
Status: DISCONTINUED | OUTPATIENT
Start: 2017-03-26 | End: 2017-03-26 | Stop reason: SURG

## 2017-03-26 RX ORDER — DIPHENHYDRAMINE HYDROCHLORIDE 50 MG/ML
12.5 INJECTION INTRAMUSCULAR; INTRAVENOUS
Status: DISCONTINUED | OUTPATIENT
Start: 2017-03-26 | End: 2017-03-26 | Stop reason: HOSPADM

## 2017-03-26 RX ORDER — SODIUM CHLORIDE 0.9 % (FLUSH) 0.9 %
1-10 SYRINGE (ML) INJECTION AS NEEDED
Status: DISCONTINUED | OUTPATIENT
Start: 2017-03-26 | End: 2017-03-26 | Stop reason: HOSPADM

## 2017-03-26 RX ORDER — HYDROMORPHONE HYDROCHLORIDE 1 MG/ML
0.5 INJECTION, SOLUTION INTRAMUSCULAR; INTRAVENOUS; SUBCUTANEOUS
Status: DISCONTINUED | OUTPATIENT
Start: 2017-03-26 | End: 2017-03-28 | Stop reason: HOSPADM

## 2017-03-26 RX ORDER — MAGNESIUM HYDROXIDE 1200 MG/15ML
LIQUID ORAL AS NEEDED
Status: DISCONTINUED | OUTPATIENT
Start: 2017-03-26 | End: 2017-03-26 | Stop reason: HOSPADM

## 2017-03-26 RX ORDER — DIPHENHYDRAMINE HCL 25 MG
25 CAPSULE ORAL EVERY 6 HOURS PRN
Status: DISCONTINUED | OUTPATIENT
Start: 2017-03-26 | End: 2017-03-28 | Stop reason: HOSPADM

## 2017-03-26 RX ORDER — HYDRALAZINE HYDROCHLORIDE 20 MG/ML
5 INJECTION INTRAMUSCULAR; INTRAVENOUS
Status: DISCONTINUED | OUTPATIENT
Start: 2017-03-26 | End: 2017-03-26 | Stop reason: HOSPADM

## 2017-03-26 RX ORDER — OXYCODONE HYDROCHLORIDE AND ACETAMINOPHEN 5; 325 MG/1; MG/1
2 TABLET ORAL EVERY 4 HOURS PRN
Status: DISCONTINUED | OUTPATIENT
Start: 2017-03-26 | End: 2017-03-27

## 2017-03-26 RX ORDER — ONDANSETRON 2 MG/ML
4 INJECTION INTRAMUSCULAR; INTRAVENOUS ONCE AS NEEDED
Status: COMPLETED | OUTPATIENT
Start: 2017-03-26 | End: 2017-03-26

## 2017-03-26 RX ORDER — SODIUM CHLORIDE 9 MG/ML
75 INJECTION, SOLUTION INTRAVENOUS CONTINUOUS
Status: DISCONTINUED | OUTPATIENT
Start: 2017-03-26 | End: 2017-03-28 | Stop reason: HOSPADM

## 2017-03-26 RX ORDER — MIDAZOLAM HYDROCHLORIDE 1 MG/ML
1 INJECTION INTRAMUSCULAR; INTRAVENOUS
Status: DISCONTINUED | OUTPATIENT
Start: 2017-03-26 | End: 2017-03-26 | Stop reason: HOSPADM

## 2017-03-26 RX ORDER — PROMETHAZINE HYDROCHLORIDE 25 MG/ML
12.5 INJECTION, SOLUTION INTRAMUSCULAR; INTRAVENOUS ONCE AS NEEDED
Status: DISCONTINUED | OUTPATIENT
Start: 2017-03-26 | End: 2017-03-26 | Stop reason: HOSPADM

## 2017-03-26 RX ORDER — VALACYCLOVIR HYDROCHLORIDE 500 MG/1
2000 TABLET, FILM COATED ORAL EVERY 12 HOURS SCHEDULED
Status: COMPLETED | OUTPATIENT
Start: 2017-03-26 | End: 2017-03-27

## 2017-03-26 RX ORDER — HYDROMORPHONE HYDROCHLORIDE 1 MG/ML
0.2 INJECTION, SOLUTION INTRAMUSCULAR; INTRAVENOUS; SUBCUTANEOUS
Status: DISCONTINUED | OUTPATIENT
Start: 2017-03-26 | End: 2017-03-26

## 2017-03-26 RX ORDER — GLYCOPYRROLATE 0.2 MG/ML
INJECTION INTRAMUSCULAR; INTRAVENOUS AS NEEDED
Status: DISCONTINUED | OUTPATIENT
Start: 2017-03-26 | End: 2017-03-26 | Stop reason: SURG

## 2017-03-26 RX ORDER — ONDANSETRON 2 MG/ML
4 INJECTION INTRAMUSCULAR; INTRAVENOUS ONCE AS NEEDED
Status: DISCONTINUED | OUTPATIENT
Start: 2017-03-26 | End: 2017-03-26 | Stop reason: HOSPADM

## 2017-03-26 RX ORDER — HYDROMORPHONE HYDROCHLORIDE 1 MG/ML
0.5 INJECTION, SOLUTION INTRAMUSCULAR; INTRAVENOUS; SUBCUTANEOUS
Status: DISCONTINUED | OUTPATIENT
Start: 2017-03-26 | End: 2017-03-26 | Stop reason: HOSPADM

## 2017-03-26 RX ORDER — SUCCINYLCHOLINE CHLORIDE 20 MG/ML
INJECTION INTRAMUSCULAR; INTRAVENOUS AS NEEDED
Status: DISCONTINUED | OUTPATIENT
Start: 2017-03-26 | End: 2017-03-26 | Stop reason: SURG

## 2017-03-26 RX ORDER — FENTANYL CITRATE 50 UG/ML
50 INJECTION, SOLUTION INTRAMUSCULAR; INTRAVENOUS
Status: DISCONTINUED | OUTPATIENT
Start: 2017-03-26 | End: 2017-03-26 | Stop reason: HOSPADM

## 2017-03-26 RX ORDER — LIDOCAINE HYDROCHLORIDE 40 MG/ML
SOLUTION TOPICAL
Status: COMPLETED
Start: 2017-03-26 | End: 2017-03-26

## 2017-03-26 RX ORDER — NEOSTIGMINE METHYLSULFATE 1 MG/ML
INJECTION, SOLUTION INTRAVENOUS AS NEEDED
Status: DISCONTINUED | OUTPATIENT
Start: 2017-03-26 | End: 2017-03-26 | Stop reason: SURG

## 2017-03-26 RX ORDER — SODIUM CHLORIDE, SODIUM LACTATE, POTASSIUM CHLORIDE, CALCIUM CHLORIDE 600; 310; 30; 20 MG/100ML; MG/100ML; MG/100ML; MG/100ML
9 INJECTION, SOLUTION INTRAVENOUS CONTINUOUS
Status: DISCONTINUED | OUTPATIENT
Start: 2017-03-26 | End: 2017-03-26

## 2017-03-26 RX ORDER — SCOLOPAMINE TRANSDERMAL SYSTEM 1 MG/1
1 PATCH, EXTENDED RELEASE TRANSDERMAL ONCE
Status: DISCONTINUED | OUTPATIENT
Start: 2017-03-26 | End: 2017-03-27

## 2017-03-26 RX ORDER — LIDOCAINE 50 MG/G
OINTMENT TOPICAL AS NEEDED
Status: DISCONTINUED | OUTPATIENT
Start: 2017-03-26 | End: 2017-03-28 | Stop reason: HOSPADM

## 2017-03-26 RX ORDER — PROPOFOL 10 MG/ML
VIAL (ML) INTRAVENOUS AS NEEDED
Status: DISCONTINUED | OUTPATIENT
Start: 2017-03-26 | End: 2017-03-26 | Stop reason: SURG

## 2017-03-26 RX ORDER — OXYCODONE HYDROCHLORIDE AND ACETAMINOPHEN 5; 325 MG/1; MG/1
1 TABLET ORAL EVERY 4 HOURS PRN
Status: DISCONTINUED | OUTPATIENT
Start: 2017-03-26 | End: 2017-03-27

## 2017-03-26 RX ORDER — ACETAMINOPHEN 650 MG
TABLET, EXTENDED RELEASE ORAL AS NEEDED
Status: DISCONTINUED | OUTPATIENT
Start: 2017-03-26 | End: 2017-03-26 | Stop reason: HOSPADM

## 2017-03-26 RX ORDER — ROCURONIUM BROMIDE 10 MG/ML
INJECTION, SOLUTION INTRAVENOUS AS NEEDED
Status: DISCONTINUED | OUTPATIENT
Start: 2017-03-26 | End: 2017-03-26 | Stop reason: SURG

## 2017-03-26 RX ORDER — MIDAZOLAM HYDROCHLORIDE 1 MG/ML
2 INJECTION INTRAMUSCULAR; INTRAVENOUS
Status: DISCONTINUED | OUTPATIENT
Start: 2017-03-26 | End: 2017-03-26 | Stop reason: HOSPADM

## 2017-03-26 RX ORDER — PROMETHAZINE HYDROCHLORIDE 25 MG/1
25 SUPPOSITORY RECTAL ONCE AS NEEDED
Status: DISCONTINUED | OUTPATIENT
Start: 2017-03-26 | End: 2017-03-26 | Stop reason: HOSPADM

## 2017-03-26 RX ORDER — FAMOTIDINE 10 MG/ML
20 INJECTION, SOLUTION INTRAVENOUS ONCE
Status: COMPLETED | OUTPATIENT
Start: 2017-03-26 | End: 2017-03-26

## 2017-03-26 RX ORDER — PROMETHAZINE HYDROCHLORIDE 25 MG/1
12.5 TABLET ORAL EVERY 6 HOURS PRN
Status: DISCONTINUED | OUTPATIENT
Start: 2017-03-26 | End: 2017-03-28 | Stop reason: HOSPADM

## 2017-03-26 RX ORDER — FENTANYL CITRATE 50 UG/ML
INJECTION, SOLUTION INTRAMUSCULAR; INTRAVENOUS
Status: COMPLETED
Start: 2017-03-26 | End: 2017-03-26

## 2017-03-26 RX ORDER — POLYETHYLENE GLYCOL 3350 17 G/17G
17 POWDER, FOR SOLUTION ORAL NIGHTLY
Status: DISCONTINUED | OUTPATIENT
Start: 2017-03-26 | End: 2017-03-27

## 2017-03-26 RX ORDER — PROMETHAZINE HYDROCHLORIDE 25 MG/ML
12.5 INJECTION, SOLUTION INTRAMUSCULAR; INTRAVENOUS EVERY 6 HOURS PRN
Status: DISCONTINUED | OUTPATIENT
Start: 2017-03-26 | End: 2017-03-28 | Stop reason: HOSPADM

## 2017-03-26 RX ADMIN — LIDOCAINE HYDROCHLORIDE 1 EACH: 40 SOLUTION TOPICAL at 07:51

## 2017-03-26 RX ADMIN — LIDOCAINE: 50 OINTMENT TOPICAL at 13:08

## 2017-03-26 RX ADMIN — METRONIDAZOLE 500 MG: 500 INJECTION, SOLUTION INTRAVENOUS at 07:41

## 2017-03-26 RX ADMIN — VALACYCLOVIR 2000 MG: 500 TABLET, FILM COATED ORAL at 17:15

## 2017-03-26 RX ADMIN — HYDROMORPHONE HYDROCHLORIDE 0.5 MG: 1 INJECTION, SOLUTION INTRAMUSCULAR; INTRAVENOUS; SUBCUTANEOUS at 19:10

## 2017-03-26 RX ADMIN — FENTANYL CITRATE 50 MCG: 50 INJECTION INTRAMUSCULAR; INTRAVENOUS at 08:53

## 2017-03-26 RX ADMIN — GLYCOPYRROLATE 0.2 MG: 0.2 INJECTION INTRAMUSCULAR; INTRAVENOUS at 07:49

## 2017-03-26 RX ADMIN — FENTANYL CITRATE 50 MCG: 50 INJECTION INTRAMUSCULAR; INTRAVENOUS at 09:06

## 2017-03-26 RX ADMIN — SODIUM CHLORIDE, POTASSIUM CHLORIDE, SODIUM LACTATE AND CALCIUM CHLORIDE 9 ML/HR: 600; 310; 30; 20 INJECTION, SOLUTION INTRAVENOUS at 07:23

## 2017-03-26 RX ADMIN — LIDOCAINE: 50 OINTMENT TOPICAL at 16:36

## 2017-03-26 RX ADMIN — OXYCODONE HYDROCHLORIDE AND ACETAMINOPHEN 2 TABLET: 5; 325 TABLET ORAL at 20:11

## 2017-03-26 RX ADMIN — ROCURONIUM BROMIDE 25 MG: 10 INJECTION INTRAVENOUS at 07:52

## 2017-03-26 RX ADMIN — HYDROMORPHONE HYDROCHLORIDE 0.2 MG: 1 INJECTION, SOLUTION INTRAMUSCULAR; INTRAVENOUS; SUBCUTANEOUS at 13:08

## 2017-03-26 RX ADMIN — LEVOFLOXACIN 500 MG: 5 INJECTION, SOLUTION INTRAVENOUS at 07:04

## 2017-03-26 RX ADMIN — POLYETHYLENE GLYCOL 3350 17 G: 17 POWDER, FOR SOLUTION ORAL at 20:57

## 2017-03-26 RX ADMIN — HYDROMORPHONE HYDROCHLORIDE 0.2 MG: 1 INJECTION, SOLUTION INTRAMUSCULAR; INTRAVENOUS; SUBCUTANEOUS at 10:09

## 2017-03-26 RX ADMIN — DEXAMETHASONE SODIUM PHOSPHATE 6 MG: 10 INJECTION INTRAMUSCULAR; INTRAVENOUS at 08:10

## 2017-03-26 RX ADMIN — HYDROMORPHONE HYDROCHLORIDE 0.5 MG: 1 INJECTION, SOLUTION INTRAMUSCULAR; INTRAVENOUS; SUBCUTANEOUS at 23:22

## 2017-03-26 RX ADMIN — LIDOCAINE: 50 OINTMENT TOPICAL at 11:40

## 2017-03-26 RX ADMIN — LIDOCAINE HYDROCHLORIDE 80 MG: 20 INJECTION, SOLUTION INFILTRATION; PERINEURAL at 07:49

## 2017-03-26 RX ADMIN — HYDROMORPHONE HYDROCHLORIDE 0.5 MG: 1 INJECTION, SOLUTION INTRAMUSCULAR; INTRAVENOUS; SUBCUTANEOUS at 15:12

## 2017-03-26 RX ADMIN — SCOPALAMINE 1 PATCH: 1 PATCH, EXTENDED RELEASE TRANSDERMAL at 07:22

## 2017-03-26 RX ADMIN — HYDROMORPHONE HYDROCHLORIDE 0.5 MG: 1 INJECTION, SOLUTION INTRAMUSCULAR; INTRAVENOUS; SUBCUTANEOUS at 17:12

## 2017-03-26 RX ADMIN — HYDROMORPHONE HYDROCHLORIDE 0.5 MG: 1 INJECTION, SOLUTION INTRAMUSCULAR; INTRAVENOUS; SUBCUTANEOUS at 09:19

## 2017-03-26 RX ADMIN — SUCCINYLCHOLINE CHLORIDE 120 MG: 20 INJECTION, SOLUTION INTRAMUSCULAR; INTRAVENOUS; PARENTERAL at 07:49

## 2017-03-26 RX ADMIN — OXYCODONE HYDROCHLORIDE AND ACETAMINOPHEN 1 TABLET: 5; 325 TABLET ORAL at 11:45

## 2017-03-26 RX ADMIN — DIPHENHYDRAMINE HYDROCHLORIDE 25 MG: 25 CAPSULE ORAL at 20:57

## 2017-03-26 RX ADMIN — MIDAZOLAM HYDROCHLORIDE 1 MG: 1 INJECTION, SOLUTION INTRAMUSCULAR; INTRAVENOUS at 07:22

## 2017-03-26 RX ADMIN — ONDANSETRON 4 MG: 2 INJECTION INTRAMUSCULAR; INTRAVENOUS at 07:22

## 2017-03-26 RX ADMIN — GLYCOPYRROLATE 0.6 MG: 0.2 INJECTION INTRAMUSCULAR; INTRAVENOUS at 08:19

## 2017-03-26 RX ADMIN — ROCURONIUM BROMIDE 5 MG: 10 INJECTION INTRAVENOUS at 07:49

## 2017-03-26 RX ADMIN — NEOSTIGMINE METHYLSULFATE 3.5 MG: 1 INJECTION INTRAVENOUS at 08:19

## 2017-03-26 RX ADMIN — PROPOFOL 150 MG: 10 INJECTION, EMULSION INTRAVENOUS at 07:49

## 2017-03-26 RX ADMIN — HYDROMORPHONE HYDROCHLORIDE 0.5 MG: 1 INJECTION, SOLUTION INTRAMUSCULAR; INTRAVENOUS; SUBCUTANEOUS at 21:03

## 2017-03-26 RX ADMIN — FAMOTIDINE 20 MG: 10 INJECTION, SOLUTION INTRAVENOUS at 07:21

## 2017-03-26 RX ADMIN — SODIUM CHLORIDE 75 ML/HR: 9 INJECTION, SOLUTION INTRAVENOUS at 19:13

## 2017-03-26 RX ADMIN — ONDANSETRON 4 MG: 2 INJECTION INTRAMUSCULAR; INTRAVENOUS at 10:09

## 2017-03-26 RX ADMIN — FENTANYL CITRATE 100 MCG: 50 INJECTION, SOLUTION INTRAMUSCULAR; INTRAVENOUS at 07:49

## 2017-03-26 RX ADMIN — OXYCODONE HYDROCHLORIDE AND ACETAMINOPHEN 2 TABLET: 5; 325 TABLET ORAL at 15:55

## 2017-03-26 NOTE — ANESTHESIA POSTPROCEDURE EVALUATION
Patient: Hema Sweet    Procedure Summary     Date Anesthesia Start Anesthesia Stop Room / Location    03/26/17 0740 0838  [facility]       Procedure Diagnosis Surgeon Provider    HEMORRHOIDECTOMY (N/A Anus) No diagnosis on file. MD Branden Jean Baptiste MD          Anesthesia Type: general  Last vitals  BP      Temp      Pulse     Resp      SpO2        Post Anesthesia Care and Evaluation    Patient location during evaluation: PACU  Patient participation: complete - patient participated  Level of consciousness: awake and alert  Pain management: adequate  Airway patency: patent  Anesthetic complications: No anesthetic complications    Cardiovascular status: acceptable  Respiratory status: acceptable  Hydration status: acceptable

## 2017-03-26 NOTE — ANESTHESIA PROCEDURE NOTES
Airway  Airway not difficult    General Information and Staff    Patient location during procedure: OR  Anesthesiologist: ARASH MALLOY    Indications and Patient Condition  Indications for airway management: airway protection    Preoxygenated: yes  Mask difficulty assessment: 1 - vent by mask    Final Airway Details  Final airway type: endotracheal airway      Successful airway: ETT  Cuffed: yes   Successful intubation technique: direct laryngoscopy  Facilitating devices/methods: intubating stylet  Endotracheal tube insertion site: oral  Blade: Young  Blade size: #2  ETT size: 8.0 mm  Cormack-Lehane Classification: grade I - full view of glottis  Placement verified by: chest auscultation and capnometry   Cuff volume (mL): 5  Measured from: teeth  ETT to teeth (cm): 22  Number of attempts at approach: 1

## 2017-03-26 NOTE — ANESTHESIA PREPROCEDURE EVALUATION
Anesthesia Evaluation     Patient summary reviewed and Nursing notes reviewed   NPO Status: > 8 hours   Airway   Mallampati: I  TM distance: >3 FB  Neck ROM: full  Dental - normal exam     Pulmonary - negative pulmonary ROS and normal exam   Cardiovascular - negative cardio ROS and normal exam    (-) angina, orthopnea, PND, GONCALVES      Neuro/Psych- negative ROS  GI/Hepatic/Renal/Endo - negative ROS     Musculoskeletal (-) negative ROS    Abdominal  - normal exam   Substance History - negative use     OB/GYN          Other - negative ROS                                   Anesthesia Plan    ASA 2     general     intravenous induction   Anesthetic plan and risks discussed with patient.

## 2017-03-27 LAB
ABO GROUP BLD: NORMAL
ANION GAP SERPL CALCULATED.3IONS-SCNC: 13.9 MMOL/L
BASOPHILS # BLD AUTO: 0.01 10*3/MM3 (ref 0–0.2)
BASOPHILS NFR BLD AUTO: 0.1 % (ref 0–1.5)
BLD GP AB SCN SERPL QL: NEGATIVE
BUN BLD-MCNC: 10 MG/DL (ref 6–20)
BUN/CREAT SERPL: 10.8 (ref 7–25)
CALCIUM SPEC-SCNC: 8.6 MG/DL (ref 8.6–10.5)
CHLORIDE SERPL-SCNC: 102 MMOL/L (ref 98–107)
CO2 SERPL-SCNC: 22.1 MMOL/L (ref 22–29)
CREAT BLD-MCNC: 0.93 MG/DL (ref 0.76–1.27)
DEPRECATED RDW RBC AUTO: 56.3 FL (ref 37–54)
ENDOMYSIUM IGA SER QL: NEGATIVE
EOSINOPHIL # BLD AUTO: 0 10*3/MM3 (ref 0–0.7)
EOSINOPHIL NFR BLD AUTO: 0 % (ref 0.3–6.2)
ERYTHROCYTE [DISTWIDTH] IN BLOOD BY AUTOMATED COUNT: 26.4 % (ref 11.5–14.5)
GFR SERPL CREATININE-BSD FRML MDRD: 98 ML/MIN/1.73
GLIADIN PEPTIDE IGA SER-ACNC: 2 UNITS (ref 0–19)
GLIADIN PEPTIDE IGG SER-ACNC: 2 UNITS (ref 0–19)
GLUCOSE BLD-MCNC: 132 MG/DL (ref 65–99)
HCT VFR BLD AUTO: 26.3 % (ref 40.4–52.2)
HGB BLD-MCNC: 7.7 G/DL (ref 13.7–17.6)
IGA SERPL-MCNC: 115 MG/DL (ref 90–386)
IMM GRANULOCYTES # BLD: 0.07 10*3/MM3 (ref 0–0.03)
IMM GRANULOCYTES NFR BLD: 0.5 % (ref 0–0.5)
LYMPHOCYTES # BLD AUTO: 0.92 10*3/MM3 (ref 0.9–4.8)
LYMPHOCYTES NFR BLD AUTO: 6.6 % (ref 19.6–45.3)
MCH RBC QN AUTO: 19.6 PG (ref 27–32.7)
MCHC RBC AUTO-ENTMCNC: 29.3 G/DL (ref 32.6–36.4)
MCV RBC AUTO: 67.1 FL (ref 79.8–96.2)
MONOCYTES # BLD AUTO: 1.27 10*3/MM3 (ref 0.2–1.2)
MONOCYTES NFR BLD AUTO: 9.1 % (ref 5–12)
NEUTROPHILS # BLD AUTO: 11.7 10*3/MM3 (ref 1.9–8.1)
NEUTROPHILS NFR BLD AUTO: 83.7 % (ref 42.7–76)
PLATELET # BLD AUTO: 421 10*3/MM3 (ref 140–500)
PMV BLD AUTO: 9.2 FL (ref 6–12)
POTASSIUM BLD-SCNC: 4.2 MMOL/L (ref 3.5–5.2)
RBC # BLD AUTO: 3.92 10*6/MM3 (ref 4.6–6)
RH BLD: POSITIVE
SODIUM BLD-SCNC: 138 MMOL/L (ref 136–145)
TTG IGA SER-ACNC: <2 U/ML (ref 0–3)
TTG IGG SER-ACNC: <2 U/ML (ref 0–5)
WBC NRBC COR # BLD: 13.97 10*3/MM3 (ref 4.5–10.7)

## 2017-03-27 PROCEDURE — P9016 RBC LEUKOCYTES REDUCED: HCPCS

## 2017-03-27 PROCEDURE — 85025 COMPLETE CBC W/AUTO DIFF WBC: CPT | Performed by: HOSPITALIST

## 2017-03-27 PROCEDURE — 63710000001 DIPHENHYDRAMINE PER 50 MG: Performed by: INTERNAL MEDICINE

## 2017-03-27 PROCEDURE — 80048 BASIC METABOLIC PNL TOTAL CA: CPT | Performed by: HOSPITALIST

## 2017-03-27 PROCEDURE — 25010000002 HYDROMORPHONE PER 4 MG: Performed by: HOSPITALIST

## 2017-03-27 PROCEDURE — 86923 COMPATIBILITY TEST ELECTRIC: CPT

## 2017-03-27 PROCEDURE — 86900 BLOOD TYPING SEROLOGIC ABO: CPT

## 2017-03-27 PROCEDURE — 36430 TRANSFUSION BLD/BLD COMPNT: CPT

## 2017-03-27 PROCEDURE — 86901 BLOOD TYPING SEROLOGIC RH(D): CPT | Performed by: HOSPITALIST

## 2017-03-27 PROCEDURE — 86900 BLOOD TYPING SEROLOGIC ABO: CPT | Performed by: HOSPITALIST

## 2017-03-27 PROCEDURE — 86850 RBC ANTIBODY SCREEN: CPT | Performed by: HOSPITALIST

## 2017-03-27 RX ORDER — OXYCODONE HYDROCHLORIDE AND ACETAMINOPHEN 5; 325 MG/1; MG/1
TABLET ORAL
Qty: 82 TABLET | Refills: 0 | Status: SHIPPED | OUTPATIENT
Start: 2017-03-27 | End: 2017-03-28 | Stop reason: HOSPADM

## 2017-03-27 RX ORDER — POLYETHYLENE GLYCOL 3350 17 G/17G
17 POWDER, FOR SOLUTION ORAL 2 TIMES DAILY
Status: DISCONTINUED | OUTPATIENT
Start: 2017-03-27 | End: 2017-03-28 | Stop reason: HOSPADM

## 2017-03-27 RX ORDER — OXYCODONE AND ACETAMINOPHEN 7.5; 325 MG/1; MG/1
1 TABLET ORAL EVERY 4 HOURS PRN
Status: DISCONTINUED | OUTPATIENT
Start: 2017-03-27 | End: 2017-03-28 | Stop reason: HOSPADM

## 2017-03-27 RX ORDER — POLYETHYLENE GLYCOL 3350 17 G/17G
17 POWDER, FOR SOLUTION ORAL NIGHTLY
Status: CANCELLED
Start: 2017-03-27

## 2017-03-27 RX ORDER — OXYCODONE AND ACETAMINOPHEN 7.5; 325 MG/1; MG/1
2 TABLET ORAL EVERY 4 HOURS PRN
Status: DISCONTINUED | OUTPATIENT
Start: 2017-04-06 | End: 2017-03-28

## 2017-03-27 RX ORDER — LIDOCAINE 50 MG/G
OINTMENT TOPICAL AS NEEDED
Qty: 50 G | Refills: 0 | Status: CANCELLED | OUTPATIENT
Start: 2017-03-27

## 2017-03-27 RX ORDER — OXYCODONE HYDROCHLORIDE AND ACETAMINOPHEN 5; 325 MG/1; MG/1
1-2 TABLET ORAL EVERY 4 HOURS PRN
Qty: 35 TABLET | Refills: 0 | Status: CANCELLED | OUTPATIENT
Start: 2017-03-27 | End: 2017-04-05

## 2017-03-27 RX ADMIN — OXYCODONE HYDROCHLORIDE AND ACETAMINOPHEN 2 TABLET: 5; 325 TABLET ORAL at 01:54

## 2017-03-27 RX ADMIN — OXYCODONE HYDROCHLORIDE AND ACETAMINOPHEN 2 TABLET: 7.5; 325 TABLET ORAL at 20:54

## 2017-03-27 RX ADMIN — HYDROMORPHONE HYDROCHLORIDE 0.5 MG: 1 INJECTION, SOLUTION INTRAMUSCULAR; INTRAVENOUS; SUBCUTANEOUS at 08:02

## 2017-03-27 RX ADMIN — HYDROMORPHONE HYDROCHLORIDE 0.5 MG: 1 INJECTION, SOLUTION INTRAMUSCULAR; INTRAVENOUS; SUBCUTANEOUS at 01:54

## 2017-03-27 RX ADMIN — DIPHENHYDRAMINE HYDROCHLORIDE 25 MG: 25 CAPSULE ORAL at 05:07

## 2017-03-27 RX ADMIN — POLYETHYLENE GLYCOL 3350 17 G: 17 POWDER, FOR SOLUTION ORAL at 18:48

## 2017-03-27 RX ADMIN — SODIUM CHLORIDE 75 ML/HR: 9 INJECTION, SOLUTION INTRAVENOUS at 08:51

## 2017-03-27 RX ADMIN — VALACYCLOVIR 2000 MG: 500 TABLET, FILM COATED ORAL at 05:08

## 2017-03-27 RX ADMIN — HYDROMORPHONE HYDROCHLORIDE 0.5 MG: 1 INJECTION, SOLUTION INTRAMUSCULAR; INTRAVENOUS; SUBCUTANEOUS at 18:48

## 2017-03-27 RX ADMIN — DIPHENHYDRAMINE HYDROCHLORIDE 25 MG: 25 CAPSULE ORAL at 13:04

## 2017-03-27 RX ADMIN — HYDROMORPHONE HYDROCHLORIDE 0.5 MG: 1 INJECTION, SOLUTION INTRAMUSCULAR; INTRAVENOUS; SUBCUTANEOUS at 14:10

## 2017-03-27 RX ADMIN — HYDROMORPHONE HYDROCHLORIDE 0.5 MG: 1 INJECTION, SOLUTION INTRAMUSCULAR; INTRAVENOUS; SUBCUTANEOUS at 05:07

## 2017-03-27 RX ADMIN — POLYETHYLENE GLYCOL 3350 17 G: 17 POWDER, FOR SOLUTION ORAL at 08:46

## 2017-03-27 RX ADMIN — OXYCODONE HYDROCHLORIDE AND ACETAMINOPHEN 2 TABLET: 7.5; 325 TABLET ORAL at 15:07

## 2017-03-27 RX ADMIN — OXYCODONE HYDROCHLORIDE AND ACETAMINOPHEN 2 TABLET: 5; 325 TABLET ORAL at 09:55

## 2017-03-27 RX ADMIN — DIPHENHYDRAMINE HYDROCHLORIDE 25 MG: 25 CAPSULE ORAL at 20:54

## 2017-03-28 VITALS
BODY MASS INDEX: 28.45 KG/M2 | HEIGHT: 66 IN | WEIGHT: 177 LBS | OXYGEN SATURATION: 99 % | RESPIRATION RATE: 16 BRPM | TEMPERATURE: 98.5 F | SYSTOLIC BLOOD PRESSURE: 132 MMHG | HEART RATE: 61 BPM | DIASTOLIC BLOOD PRESSURE: 78 MMHG

## 2017-03-28 LAB
ABO + RH BLD: NORMAL
BASOPHILS # BLD AUTO: 0.05 10*3/MM3 (ref 0–0.2)
BASOPHILS NFR BLD AUTO: 0.5 % (ref 0–1.5)
BH BB BLOOD EXPIRATION DATE: NORMAL
BH BB BLOOD TYPE BARCODE: 9500
BH BB DISPENSE STATUS: NORMAL
BH BB PRODUCT CODE: NORMAL
BH BB UNIT NUMBER: NORMAL
CALPROTECTIN STL-MCNT: <16 UG/G (ref 0–120)
CYTO UR: NORMAL
DEPRECATED RDW RBC AUTO: 67 FL (ref 37–54)
EOSINOPHIL # BLD AUTO: 0.08 10*3/MM3 (ref 0–0.7)
EOSINOPHIL NFR BLD AUTO: 0.8 % (ref 0.3–6.2)
ERYTHROCYTE [DISTWIDTH] IN BLOOD BY AUTOMATED COUNT: 29.2 % (ref 11.5–14.5)
HCT VFR BLD AUTO: 29.7 % (ref 40.4–52.2)
HGB BLD-MCNC: 8.8 G/DL (ref 13.7–17.6)
IMM GRANULOCYTES # BLD: 0.05 10*3/MM3 (ref 0–0.03)
IMM GRANULOCYTES NFR BLD: 0.5 % (ref 0–0.5)
LAB AP CASE REPORT: NORMAL
LYMPHOCYTES # BLD AUTO: 2.68 10*3/MM3 (ref 0.9–4.8)
LYMPHOCYTES NFR BLD AUTO: 27.1 % (ref 19.6–45.3)
Lab: NORMAL
MCH RBC QN AUTO: 20.9 PG (ref 27–32.7)
MCHC RBC AUTO-ENTMCNC: 29.6 G/DL (ref 32.6–36.4)
MCV RBC AUTO: 70.5 FL (ref 79.8–96.2)
MONOCYTES # BLD AUTO: 0.85 10*3/MM3 (ref 0.2–1.2)
MONOCYTES NFR BLD AUTO: 8.6 % (ref 5–12)
NEUTROPHILS # BLD AUTO: 6.17 10*3/MM3 (ref 1.9–8.1)
NEUTROPHILS NFR BLD AUTO: 62.5 % (ref 42.7–76)
PATH REPORT.FINAL DX SPEC: NORMAL
PATH REPORT.GROSS SPEC: NORMAL
PLATELET # BLD AUTO: 341 10*3/MM3 (ref 140–500)
PMV BLD AUTO: 9.3 FL (ref 6–12)
RBC # BLD AUTO: 4.21 10*6/MM3 (ref 4.6–6)
UNIT  ABO: NORMAL
UNIT  RH: NORMAL
WBC NRBC COR # BLD: 9.88 10*3/MM3 (ref 4.5–10.7)

## 2017-03-28 PROCEDURE — 25010000002 HYDROMORPHONE PER 4 MG: Performed by: HOSPITALIST

## 2017-03-28 PROCEDURE — 63710000001 PROMETHAZINE PER 25 MG: Performed by: COLON & RECTAL SURGERY

## 2017-03-28 PROCEDURE — 85025 COMPLETE CBC W/AUTO DIFF WBC: CPT | Performed by: HOSPITALIST

## 2017-03-28 RX ORDER — LIDOCAINE 50 MG/G
OINTMENT TOPICAL 3 TIMES DAILY
Qty: 50 G | Refills: 0 | Status: SHIPPED | OUTPATIENT
Start: 2017-03-28 | End: 2017-05-19 | Stop reason: SDUPTHER

## 2017-03-28 RX ORDER — POLYETHYLENE GLYCOL 3350 17 G/17G
17 POWDER, FOR SOLUTION ORAL 2 TIMES DAILY
Qty: 225 G | Refills: 0 | Status: SHIPPED | OUTPATIENT
Start: 2017-03-28 | End: 2018-07-05

## 2017-03-28 RX ORDER — OXYCODONE AND ACETAMINOPHEN 7.5; 325 MG/1; MG/1
1 TABLET ORAL EVERY 4 HOURS PRN
Qty: 40 TABLET | Refills: 0 | Status: SHIPPED | OUTPATIENT
Start: 2017-03-28 | End: 2017-05-02

## 2017-03-28 RX ORDER — OXYCODONE AND ACETAMINOPHEN 7.5; 325 MG/1; MG/1
2 TABLET ORAL EVERY 4 HOURS PRN
Status: DISCONTINUED | OUTPATIENT
Start: 2017-03-28 | End: 2017-03-28 | Stop reason: HOSPADM

## 2017-03-28 RX ADMIN — OXYCODONE HYDROCHLORIDE AND ACETAMINOPHEN 2 TABLET: 7.5; 325 TABLET ORAL at 19:01

## 2017-03-28 RX ADMIN — POLYETHYLENE GLYCOL 3350 17 G: 17 POWDER, FOR SOLUTION ORAL at 04:31

## 2017-03-28 RX ADMIN — PROMETHAZINE HYDROCHLORIDE 12.5 MG: 25 TABLET ORAL at 15:10

## 2017-03-28 RX ADMIN — OXYCODONE HYDROCHLORIDE AND ACETAMINOPHEN 2 TABLET: 7.5; 325 TABLET ORAL at 10:27

## 2017-03-28 RX ADMIN — OXYCODONE HYDROCHLORIDE AND ACETAMINOPHEN 2 TABLET: 7.5; 325 TABLET ORAL at 15:11

## 2017-03-28 RX ADMIN — OXYCODONE HYDROCHLORIDE AND ACETAMINOPHEN 2 TABLET: 7.5; 325 TABLET ORAL at 04:30

## 2017-03-28 RX ADMIN — HYDROMORPHONE HYDROCHLORIDE 0.5 MG: 1 INJECTION, SOLUTION INTRAMUSCULAR; INTRAVENOUS; SUBCUTANEOUS at 00:27

## 2017-03-30 ENCOUNTER — TELEPHONE (OUTPATIENT)
Dept: FAMILY MEDICINE CLINIC | Facility: CLINIC | Age: 27
End: 2017-03-30

## 2017-03-30 DIAGNOSIS — D62 ACUTE POSTHEMORRHAGIC ANEMIA: Primary | ICD-10-CM

## 2017-03-30 NOTE — TELEPHONE ENCOUNTER
Patient is unable to get in to see you until 04- for a hospital follow up. He had a hemorrhoidectomy and is needing orders to have his hemoglobin checked before they can see you

## 2017-04-04 LAB
BASOPHILS # BLD AUTO: 0.04 10*3/MM3 (ref 0–0.2)
BASOPHILS NFR BLD AUTO: 0.5 % (ref 0–1.5)
DIFFERENTIAL COMMENT: NORMAL
EOSINOPHIL # BLD AUTO: 0.1 10*3/MM3 (ref 0–0.7)
EOSINOPHIL NFR BLD AUTO: 1.2 % (ref 0.3–6.2)
HCT VFR BLD AUTO: 36.3 % (ref 40.4–52.2)
HGB BLD-MCNC: 10.7 G/DL (ref 13.7–17.6)
IMM GRANULOCYTES # BLD: 0.03 10*3/MM3 (ref 0–0.03)
IMM GRANULOCYTES NFR BLD: 0.4 % (ref 0–0.5)
LYMPHOCYTES # BLD AUTO: 1.36 10*3/MM3 (ref 0.9–4.8)
LYMPHOCYTES NFR BLD AUTO: 16.6 % (ref 19.6–45.3)
MCH RBC QN AUTO: 21.1 PG (ref 27–32.7)
MCHC RBC AUTO-ENTMCNC: 29.5 G/DL (ref 32.6–36.4)
MCV RBC AUTO: 71.5 FL (ref 79.8–96.2)
MONOCYTES # BLD AUTO: 0.47 10*3/MM3 (ref 0.2–1.2)
MONOCYTES NFR BLD AUTO: 5.8 % (ref 5–12)
NEUTROPHILS # BLD AUTO: 6.17 10*3/MM3 (ref 1.9–8.1)
NEUTROPHILS NFR BLD AUTO: 75.5 % (ref 42.7–76)
PLATELET # BLD AUTO: 399 10*3/MM3 (ref 140–500)
PLATELET BLD QL SMEAR: NORMAL
RBC # BLD AUTO: 5.08 10*6/MM3 (ref 4.6–6)
RBC MORPH BLD: NORMAL
WBC # BLD AUTO: 8.17 10*3/MM3 (ref 4.5–10.7)

## 2017-04-05 ENCOUNTER — OFFICE VISIT (OUTPATIENT)
Dept: GASTROENTEROLOGY | Facility: CLINIC | Age: 27
End: 2017-04-05

## 2017-04-05 VITALS
DIASTOLIC BLOOD PRESSURE: 78 MMHG | BODY MASS INDEX: 26.1 KG/M2 | SYSTOLIC BLOOD PRESSURE: 128 MMHG | WEIGHT: 162.4 LBS | HEIGHT: 66 IN

## 2017-04-05 DIAGNOSIS — R30.0 DYSURIA: ICD-10-CM

## 2017-04-05 DIAGNOSIS — K64.2 THIRD DEGREE HEMORRHOIDS: ICD-10-CM

## 2017-04-05 DIAGNOSIS — K62.5 RECTAL BLEEDING: ICD-10-CM

## 2017-04-05 DIAGNOSIS — R19.7 DIARRHEA, UNSPECIFIED TYPE: ICD-10-CM

## 2017-04-05 DIAGNOSIS — D50.0 IRON DEFICIENCY ANEMIA DUE TO CHRONIC BLOOD LOSS: Primary | ICD-10-CM

## 2017-04-05 LAB
BILIRUB UR QL STRIP: NEGATIVE
CLARITY UR: CLEAR
COLOR UR: YELLOW
GLUCOSE UR STRIP-MCNC: NEGATIVE MG/DL
HCT VFR BLD AUTO: 38.9 % (ref 40.4–52.2)
HGB BLD-MCNC: 11.7 G/DL (ref 13.7–17.6)
HGB UR QL STRIP.AUTO: NEGATIVE
KETONES UR QL STRIP: ABNORMAL
LEUKOCYTE ESTERASE UR QL STRIP.AUTO: NEGATIVE
MCH RBC QN AUTO: 21 PG (ref 27–32.7)
MCHC RBC AUTO-ENTMCNC: 30.1 G/DL (ref 32.6–36.4)
MCV RBC AUTO: 69.8 FL (ref 79.8–96.2)
NITRITE UR QL STRIP: NEGATIVE
PH UR STRIP.AUTO: <=5 [PH] (ref 5–8)
PLATELET # BLD AUTO: 409 10*3/MM3 (ref 140–500)
PROT UR QL STRIP: NEGATIVE
RBC # BLD AUTO: 5.57 10*6/MM3 (ref 4.6–6)
SP GR UR STRIP: >=1.03 (ref 1–1.03)
UROBILINOGEN UR QL STRIP: ABNORMAL
WBC # BLD AUTO: 10.09 10*3/MM3 (ref 4.5–10.7)

## 2017-04-05 PROCEDURE — 99283 EMERGENCY DEPT VISIT LOW MDM: CPT

## 2017-04-05 PROCEDURE — 81003 URINALYSIS AUTO W/O SCOPE: CPT | Performed by: EMERGENCY MEDICINE

## 2017-04-05 PROCEDURE — 99214 OFFICE O/P EST MOD 30 MIN: CPT | Performed by: INTERNAL MEDICINE

## 2017-04-05 PROCEDURE — 87086 URINE CULTURE/COLONY COUNT: CPT | Performed by: PHYSICIAN ASSISTANT

## 2017-04-05 RX ORDER — DICYCLOMINE HCL 20 MG
20 TABLET ORAL EVERY 6 HOURS PRN
Qty: 90 TABLET | Refills: 1 | Status: SHIPPED | OUTPATIENT
Start: 2017-04-05 | End: 2017-05-05

## 2017-04-05 NOTE — PROGRESS NOTES
"Chief Complaint   Patient presents with   • Rectal Bleeding     Hospital follow up   • Anemia   • chronic diarrhea     Subjective     HPI  Hema Sweet is a 26 y.o. male who presents For hospital follow-up of rectal bleeding, anemia and chronic diarrhea.  He was hospitalized from March 22 through March 28.  He had presented to his primary care physician with complaints of intermittent rectal bleeding and abdominal pain.  A routine CBC demonstrated a hemoglobin of 7.0.  He was subsequent sent to the emergency room which prompted his hospitalization.  He has complaints of abdominal  pain and rectal bleeding, intermittent, for at least 1year. He describes a few days of bright red blood with passing bowel movements followed by weeks to months with no symptoms. He does admit to having \"stomach issues\" for over 10 years that he relates to having abdominal pain after eating, postprandial bloating, and diarrhea.     He underwent EGD and colonoscopy with Dr. Coates while in the hospital.  The colonoscopy was notable only for internal hemorrhoids.  There was a single abs today and the terminal ileum, otherwise it appeared normal.  Random biopsies of the colon and terminal ileum were normal.  EGD was normal as well.  There is no evidence of H. pylori or celiac disease on the biopsies.  He subsequently underwent a hemorrhoidectomy with Dr. Michelle Whitten.  Since discharge, he has been stable.  Here for the past couple of days he has had more rectal bleeding with bowel movements.  He reports up to 5 bowel movements during the daytime.  This is a chronic issue for him.  He denies any nocturnal stools.  He does not have follow-up with Dr. Whitten's office at this time.  His hemoglobin was checked 2 days ago and was 10.3.  He is intentionally eating less to not stools much.  He is having some tenderness in his rectal area.  He was on MiraLAX to help have soft bowel movements but stopped this due to excessive diarrhea.  He was " additionally reporting some issues with difficulty urinating.  When he does strain to urinate, he will pass blood from his rectum.  He has been back to work for 2 days.  He still does endorse fatigue.    He does have follow-up with his primary care physician on April 10.    Family history is pertinent for maternal grandmother and maternal aunt with ulcerative colitis and a maternal aunt with Crohn's disease.    Past Medical History:   Diagnosis Date   • Anemia    • Bell's palsy    • Chronic diarrhea    • External hemorrhoids    • Internal hemorrhoids    • Mononucleosis        Social History     Social History   • Marital status: Single     Spouse name: N/A   • Number of children: N/A   • Years of education: N/A     Social History Main Topics   • Smoking status: Never Smoker   • Smokeless tobacco: None   • Alcohol use Yes      Comment: rare   • Drug use: No   • Sexual activity: Not Asked     Other Topics Concern   • None     Social History Narrative         Current Outpatient Prescriptions:   •  lidocaine (XYLOCAINE) 5 % ointment, Apply  topically 3 (Three) Times a Day., Disp: 50 g, Rfl: 0  •  oxyCODONE-acetaminophen (PERCOCET) 7.5-325 MG per tablet, Take 1 tablet by mouth Every 4 (Four) Hours As Needed for Severe Pain (7-10)., Disp: 40 tablet, Rfl: 0  •  dicyclomine (BENTYL) 20 MG tablet, Take 1 tablet by mouth Every 6 (Six) Hours As Needed (cramping) for up to 30 days., Disp: 90 tablet, Rfl: 1  •  phenylephrine-cocoa Butter (PREPARATION H) 0.25-88.44 % suppository suppository, Insert 1 suppository into the rectum Every 6 (Six) Hours As Needed for Hemorrhoids., Disp: 30 suppository, Rfl: 1  •  polyethylene glycol (MIRALAX) packet, Take 17 g by mouth 2 (Two) Times a Day., Disp: 225 g, Rfl: 0    Review of Systems   Constitutional: Positive for fatigue. Negative for activity change, appetite change and fever.   HENT: Negative for congestion, sore throat and trouble swallowing.    Respiratory: Negative.     Cardiovascular: Negative.    Gastrointestinal: Positive for anal bleeding, diarrhea and rectal pain. Negative for abdominal distention, abdominal pain and blood in stool.   Endocrine: Negative for cold intolerance and heat intolerance.   Genitourinary: Positive for difficulty urinating and dysuria. Negative for frequency.   Musculoskeletal: Negative for arthralgias, back pain and myalgias.   Skin: Negative.    Hematological: Negative for adenopathy. Does not bruise/bleed easily.   All other systems reviewed and are negative.      Objective   Vitals:    04/05/17 1523   BP: 128/78     Last Weight    04/05/17  1523   Weight: 162 lb 6.4 oz (73.7 kg)     Body mass index is 26.21 kg/(m^2).      Physical Exam   Constitutional: He is oriented to person, place, and time. He appears well-developed and well-nourished. No distress.   HENT:   Head: Normocephalic and atraumatic.   Right Ear: External ear normal.   Left Ear: External ear normal.   Nose: Nose normal.   Mouth/Throat: Oropharynx is clear and moist.   Eyes: Conjunctivae and EOM are normal. Right eye exhibits no discharge. Left eye exhibits no discharge. No scleral icterus.   Neck: Normal range of motion. Neck supple. No thyromegaly present.   No supraclavicular adenopathy   Cardiovascular: Normal rate, regular rhythm, normal heart sounds and intact distal pulses.  Exam reveals no gallop.    No murmur heard.  No lower extremity edema   Pulmonary/Chest: Effort normal and breath sounds normal. No respiratory distress. He has no wheezes.   Abdominal: Soft. Normal appearance and bowel sounds are normal. He exhibits no distension and no mass. There is no hepatosplenomegaly. There is no tenderness. There is no rigidity, no rebound and no guarding. No hernia.   Genitourinary:   Genitourinary Comments: Rectal exam deferred   Musculoskeletal: Normal range of motion. He exhibits no edema or tenderness.   No atrophy of upper or lower extremities.  Normal digits and nails of  both hands.   Lymphadenopathy:     He has no cervical adenopathy.   Neurological: He is alert and oriented to person, place, and time. He displays no atrophy. Coordination normal.   Skin: Skin is warm and dry. No rash noted. He is not diaphoretic. No erythema. There is pallor.   Psychiatric: He has a normal mood and affect. His behavior is normal. Judgment and thought content normal.   Vitals reviewed.      WBC   Date Value Ref Range Status   04/03/2017 8.17 4.50 - 10.70 10*3/mm3 Final   03/28/2017 9.88 4.50 - 10.70 10*3/mm3 Final     RBC   Date Value Ref Range Status   04/03/2017 5.08 4.60 - 6.00 10*6/mm3 Final   03/28/2017 4.21 (L) 4.60 - 6.00 10*6/mm3 Final     Hemoglobin   Date Value Ref Range Status   04/03/2017 10.7 (L) 13.7 - 17.6 g/dL Final   03/28/2017 8.8 (L) 13.7 - 17.6 g/dL Final     Hematocrit   Date Value Ref Range Status   04/03/2017 36.3 (L) 40.4 - 52.2 % Final   03/28/2017 29.7 (L) 40.4 - 52.2 % Final     MCV   Date Value Ref Range Status   04/03/2017 71.5 (L) 79.8 - 96.2 fL Final   03/28/2017 70.5 (L) 79.8 - 96.2 fL Final     MCH   Date Value Ref Range Status   04/03/2017 21.1 (L) 27.0 - 32.7 pg Final   03/28/2017 20.9 (L) 27.0 - 32.7 pg Final     MCHC   Date Value Ref Range Status   04/03/2017 29.5 (L) 32.6 - 36.4 g/dL Final   03/28/2017 29.6 (L) 32.6 - 36.4 g/dL Final     RDW   Date Value Ref Range Status   03/28/2017 29.2 (H) 11.5 - 14.5 % Final     RDW-SD   Date Value Ref Range Status   03/28/2017 67.0 (H) 37.0 - 54.0 fl Final     MPV   Date Value Ref Range Status   03/28/2017 9.3 6.0 - 12.0 fL Final     Platelets   Date Value Ref Range Status   04/03/2017 399 140 - 500 10*3/mm3 Final   03/28/2017 341 140 - 500 10*3/mm3 Final     Neutrophil %   Date Value Ref Range Status   03/28/2017 62.5 42.7 - 76.0 % Final     Neutrophil Rel %   Date Value Ref Range Status   04/03/2017 75.5 42.7 - 76.0 % Final     Lymphocyte %   Date Value Ref Range Status   03/28/2017 27.1 19.6 - 45.3 % Final     Lymphocyte  Rel %   Date Value Ref Range Status   04/03/2017 16.6 (L) 19.6 - 45.3 % Final     Monocyte %   Date Value Ref Range Status   03/28/2017 8.6 5.0 - 12.0 % Final     Monocyte Rel %   Date Value Ref Range Status   04/03/2017 5.8 5.0 - 12.0 % Final     Eosinophil %   Date Value Ref Range Status   03/28/2017 0.8 0.3 - 6.2 % Final     Eosinophil Rel %   Date Value Ref Range Status   04/03/2017 1.2 0.3 - 6.2 % Final     Basophil %   Date Value Ref Range Status   03/28/2017 0.5 0.0 - 1.5 % Final     Basophil Rel %   Date Value Ref Range Status   04/03/2017 0.5 0.0 - 1.5 % Final     Immature Grans %   Date Value Ref Range Status   03/28/2017 0.5 0.0 - 0.5 % Final     Neutrophils, Absolute   Date Value Ref Range Status   03/28/2017 6.17 1.90 - 8.10 10*3/mm3 Final     Neutrophils Absolute   Date Value Ref Range Status   04/03/2017 6.17 1.90 - 8.10 10*3/mm3 Final     Lymphocytes, Absolute   Date Value Ref Range Status   03/28/2017 2.68 0.90 - 4.80 10*3/mm3 Final     Lymphocytes Absolute   Date Value Ref Range Status   04/03/2017 1.36 0.90 - 4.80 10*3/mm3 Final     Monocytes, Absolute   Date Value Ref Range Status   03/28/2017 0.85 0.20 - 1.20 10*3/mm3 Final     Monocytes Absolute   Date Value Ref Range Status   04/03/2017 0.47 0.20 - 1.20 10*3/mm3 Final     Eosinophils, Absolute   Date Value Ref Range Status   03/28/2017 0.08 0.00 - 0.70 10*3/mm3 Final     Eosinophils Absolute   Date Value Ref Range Status   04/03/2017 0.10 0.00 - 0.70 10*3/mm3 Final     Basophils, Absolute   Date Value Ref Range Status   03/28/2017 0.05 0.00 - 0.20 10*3/mm3 Final     Basophils Absolute   Date Value Ref Range Status   04/03/2017 0.04 0.00 - 0.20 10*3/mm3 Final     Immature Grans, Absolute   Date Value Ref Range Status   03/28/2017 0.05 (H) 0.00 - 0.03 10*3/mm3 Final     nRBC   Date Value Ref Range Status   03/23/2017 0.0 0.0 - 0.0 /100 WBC Final       Lab Results   Component Value Date    GLUCOSE 132 (H) 03/27/2017    BUN 10 03/27/2017     CREATININE 0.93 03/27/2017    EGFRIFNONA 98 03/27/2017    EGFRIFAFRI 125 03/20/2017    BCR 10.8 03/27/2017    CO2 22.1 03/27/2017    CALCIUM 8.6 03/27/2017    PROTENTOTREF 6.8 03/20/2017    ALBUMIN 4.60 03/22/2017    LABIL2 1.8 03/22/2017    AST 19 03/22/2017    ALT 19 03/22/2017       IMPRESSION  1. Equivocal minimal wall thickening of the rectum. Further evaluation  in this patient with reported rectal bleeding is suggested.  2. No other significant findings are noted.      This report was finalized on 3/22/2017 8:58 PM by Dr. Domingo Tan MD.  EXAMINATION: GALLBLADDER SONOGRAM      HISTORY: 26-year-old male with a history of nausea and lightheadedness.      FINDINGS: Sonographic evaluation of the gallbladder and selective  structures of the right upper quadrant was performed. The right kidney  has a normal appearance. No abnormality of the liver is appreciated. The  gallbladder appears normal. The common bile duct measures 0.3 cm in  diameter. Per the sonographer, the patient was nontender in the right  upper quadrant during the examination. Visualized portion of the  pancreas appears normal.      IMPRESSION:  Negative gallbladder sonogram.      This report was finalized on 3/24/2017 6:34 PM by Dr. Juan Miguel Martin MD.      Assessment/Plan    Iron deficiency anemia due to chronic blood loss: He had been improving since being discharged from the hospital.  He is worried now that he has had a couple of days of rectal bleeding that his hemoglobin has declined further.      Diarrhea: Chronic issue.  However at the current time it seems to be exacerbating his rectal bleeding status post hemorrhoidectomy.  He has subsequently stopped the MiraLAX that he was prescribed upon discharge from the hospital    Internal hemorrhoids: He is status post hemorrhoidectomy    Rectal bleeding: This is been present the past 2 days.  Most likely from his hemorrhoid surgery.    Dysuria: This is been present since discharge from  the hospital.  Unfortunately, he feels that he is straining to urinate and that this is causing him to pass blood from his rectum    Plan:  -We'll check a CBC to her see where his hemoglobin is  -Urinalysis with reflex culture to rule out UTI in the setting of dysuria  -Trial of phenylephrine Preparation H suppositories for rectal bleeding  -Trial of dicyclomine for his diarrhea.  I have cautioned him to stop this if his stool becomes too firm  I will have my nurse's contact Dr. Whitten's office to arrange for follow-up for him  -I will have him back to the office in 3 months to see how he is doing, sooner if needed    Hema was seen today for rectal bleeding, anemia and chronic diarrhea.    Diagnoses and all orders for this visit:    Iron deficiency anemia due to chronic blood loss  -     CBC (No Diff)    Third degree hemorrhoids  -     phenylephrine-cocoa Butter (PREPARATION H) 0.25-88.44 % suppository suppository; Insert 1 suppository into the rectum Every 6 (Six) Hours As Needed for Hemorrhoids.    Rectal bleeding  -     CBC (No Diff)    Dysuria  -     Urinalysis With / Culture If Indicated    Diarrhea, unspecified type  -     dicyclomine (BENTYL) 20 MG tablet; Take 1 tablet by mouth Every 6 (Six) Hours As Needed (cramping) for up to 30 days.        EMR Dragon/Transcription disclaimer:       Much of this encounter note is an electronic transcription/translation of spoken language to printed text. The electronic translation of spoken language may permit erroneous, or at times, nonsensical words or phrases to be inadvertently transcribed; Although I have reviewed the note for such errors, some may still exist.

## 2017-04-05 NOTE — PATIENT INSTRUCTIONS
Labs today    Use the dicyclomine as directed, hold if you start getting firm stools    Use the preparation H suppositories    For any additional questions, concerns or changes to your condition after today's office visit please contact the office at [phone].

## 2017-04-06 ENCOUNTER — HOSPITAL ENCOUNTER (EMERGENCY)
Facility: HOSPITAL | Age: 27
Discharge: HOME | End: 2017-04-06
Attending: EMERGENCY MEDICINE | Admitting: EMERGENCY MEDICINE

## 2017-04-06 ENCOUNTER — TELEPHONE (OUTPATIENT)
Dept: SURGERY | Facility: CLINIC | Age: 27
End: 2017-04-06

## 2017-04-06 ENCOUNTER — TELEPHONE (OUTPATIENT)
Dept: GASTROENTEROLOGY | Facility: CLINIC | Age: 27
End: 2017-04-06

## 2017-04-06 ENCOUNTER — APPOINTMENT (OUTPATIENT)
Dept: CT IMAGING | Facility: HOSPITAL | Age: 27
End: 2017-04-06

## 2017-04-06 VITALS
SYSTOLIC BLOOD PRESSURE: 124 MMHG | HEIGHT: 66 IN | HEART RATE: 59 BPM | BODY MASS INDEX: 26.03 KG/M2 | RESPIRATION RATE: 16 BRPM | WEIGHT: 162 LBS | OXYGEN SATURATION: 100 % | TEMPERATURE: 97.3 F | DIASTOLIC BLOOD PRESSURE: 73 MMHG

## 2017-04-06 DIAGNOSIS — R30.0 DYSURIA: ICD-10-CM

## 2017-04-06 DIAGNOSIS — R10.30 LOWER ABDOMINAL PAIN: Primary | ICD-10-CM

## 2017-04-06 LAB
ALBUMIN SERPL-MCNC: 4.7 G/DL (ref 3.5–5.2)
ALBUMIN/GLOB SERPL: 1.7 G/DL
ALP SERPL-CCNC: 122 U/L (ref 39–117)
ALT SERPL W P-5'-P-CCNC: 32 U/L (ref 1–41)
ANION GAP SERPL CALCULATED.3IONS-SCNC: 15.7 MMOL/L
ANISOCYTOSIS BLD QL: NORMAL
AST SERPL-CCNC: 16 U/L (ref 1–40)
BASOPHILS # BLD AUTO: 0.05 10*3/MM3 (ref 0–0.2)
BASOPHILS NFR BLD AUTO: 0.6 % (ref 0–1.5)
BILIRUB SERPL-MCNC: 0.3 MG/DL (ref 0.1–1.2)
BUN BLD-MCNC: 11 MG/DL (ref 6–20)
BUN/CREAT SERPL: 11.6 (ref 7–25)
CALCIUM SPEC-SCNC: 9.9 MG/DL (ref 8.6–10.5)
CHLORIDE SERPL-SCNC: 100 MMOL/L (ref 98–107)
CO2 SERPL-SCNC: 24.3 MMOL/L (ref 22–29)
CREAT BLD-MCNC: 0.95 MG/DL (ref 0.76–1.27)
DACRYOCYTES BLD QL SMEAR: NORMAL
ELLIPTOCYTES BLD QL SMEAR: NORMAL
EOSINOPHIL # BLD AUTO: 0.06 10*3/MM3 (ref 0–0.7)
EOSINOPHIL NFR BLD AUTO: 0.7 % (ref 0.3–6.2)
ERYTHROCYTE [DISTWIDTH] IN BLOOD BY AUTOMATED COUNT: ABNORMAL % (ref 11.5–14.5)
GFR SERPL CREATININE-BSD FRML MDRD: 96 ML/MIN/1.73
GLOBULIN UR ELPH-MCNC: 2.7 GM/DL
GLUCOSE BLD-MCNC: 90 MG/DL (ref 65–99)
HCT VFR BLD AUTO: 35.1 % (ref 40.4–52.2)
HGB BLD-MCNC: 10.9 G/DL (ref 13.7–17.6)
HYPOCHROMIA BLD QL: NORMAL
IMM GRANULOCYTES # BLD: 0.02 10*3/MM3 (ref 0–0.03)
IMM GRANULOCYTES NFR BLD: 0.2 % (ref 0–0.5)
LIPASE SERPL-CCNC: 21 U/L (ref 13–60)
LYMPHOCYTES # BLD AUTO: 2.52 10*3/MM3 (ref 0.9–4.8)
LYMPHOCYTES NFR BLD AUTO: 30.7 % (ref 19.6–45.3)
MCH RBC QN AUTO: 21.7 PG (ref 27–32.7)
MCHC RBC AUTO-ENTMCNC: 31.1 G/DL (ref 32.6–36.4)
MCV RBC AUTO: 69.9 FL (ref 79.8–96.2)
MICROCYTES BLD QL: NORMAL
MONOCYTES # BLD AUTO: 0.79 10*3/MM3 (ref 0.2–1.2)
MONOCYTES NFR BLD AUTO: 9.6 % (ref 5–12)
NEUTROPHILS # BLD AUTO: 4.77 10*3/MM3 (ref 1.9–8.1)
NEUTROPHILS NFR BLD AUTO: 58.2 % (ref 42.7–76)
NRBC BLD MANUAL-RTO: 0 /100 WBC (ref 0–0)
PLAT MORPH BLD: NORMAL
PLATELET # BLD AUTO: 379 10*3/MM3 (ref 140–500)
PMV BLD AUTO: 9.8 FL (ref 6–12)
POTASSIUM BLD-SCNC: 3.7 MMOL/L (ref 3.5–5.2)
PROT SERPL-MCNC: 7.4 G/DL (ref 6–8.5)
RBC # BLD AUTO: 5.02 10*6/MM3 (ref 4.6–6)
SODIUM BLD-SCNC: 140 MMOL/L (ref 136–145)
WBC MORPH BLD: NORMAL
WBC NRBC COR # BLD: 8.21 10*3/MM3 (ref 4.5–10.7)

## 2017-04-06 PROCEDURE — 96374 THER/PROPH/DIAG INJ IV PUSH: CPT

## 2017-04-06 PROCEDURE — 0 IOPAMIDOL PER 1 ML: Performed by: EMERGENCY MEDICINE

## 2017-04-06 PROCEDURE — 25010000002 ONDANSETRON PER 1 MG: Performed by: EMERGENCY MEDICINE

## 2017-04-06 PROCEDURE — 96361 HYDRATE IV INFUSION ADD-ON: CPT

## 2017-04-06 PROCEDURE — 74177 CT ABD & PELVIS W/CONTRAST: CPT

## 2017-04-06 PROCEDURE — 83690 ASSAY OF LIPASE: CPT | Performed by: PHYSICIAN ASSISTANT

## 2017-04-06 PROCEDURE — 80053 COMPREHEN METABOLIC PANEL: CPT | Performed by: PHYSICIAN ASSISTANT

## 2017-04-06 PROCEDURE — 85007 BL SMEAR W/DIFF WBC COUNT: CPT | Performed by: PHYSICIAN ASSISTANT

## 2017-04-06 PROCEDURE — 96375 TX/PRO/DX INJ NEW DRUG ADDON: CPT

## 2017-04-06 PROCEDURE — 25010000002 MORPHINE PER 10 MG: Performed by: EMERGENCY MEDICINE

## 2017-04-06 PROCEDURE — 85025 COMPLETE CBC W/AUTO DIFF WBC: CPT | Performed by: PHYSICIAN ASSISTANT

## 2017-04-06 RX ORDER — NAPROXEN SODIUM 550 MG/1
550 TABLET ORAL 2 TIMES DAILY WITH MEALS
Qty: 20 TABLET | Refills: 0 | Status: SHIPPED | OUTPATIENT
Start: 2017-04-06 | End: 2017-04-06 | Stop reason: HOSPADM

## 2017-04-06 RX ORDER — OXYCODONE AND ACETAMINOPHEN 7.5; 325 MG/1; MG/1
1 TABLET ORAL EVERY 6 HOURS PRN
Qty: 15 TABLET | Refills: 0 | Status: SHIPPED | OUTPATIENT
Start: 2017-04-06 | End: 2017-04-10 | Stop reason: SDUPTHER

## 2017-04-06 RX ORDER — SODIUM CHLORIDE 0.9 % (FLUSH) 0.9 %
10 SYRINGE (ML) INJECTION AS NEEDED
Status: DISCONTINUED | OUTPATIENT
Start: 2017-04-06 | End: 2017-04-06 | Stop reason: HOSPADM

## 2017-04-06 RX ORDER — CYCLOBENZAPRINE HCL 10 MG
10 TABLET ORAL 3 TIMES DAILY
Qty: 20 TABLET | Refills: 0 | Status: SHIPPED | OUTPATIENT
Start: 2017-04-06 | End: 2017-04-06 | Stop reason: HOSPADM

## 2017-04-06 RX ORDER — ONDANSETRON 2 MG/ML
4 INJECTION INTRAMUSCULAR; INTRAVENOUS ONCE
Status: COMPLETED | OUTPATIENT
Start: 2017-04-06 | End: 2017-04-06

## 2017-04-06 RX ADMIN — IOPAMIDOL 88 ML: 755 INJECTION, SOLUTION INTRAVENOUS at 03:07

## 2017-04-06 RX ADMIN — ONDANSETRON 4 MG: 2 INJECTION INTRAMUSCULAR; INTRAVENOUS at 03:01

## 2017-04-06 RX ADMIN — MORPHINE SULFATE 4 MG: 4 INJECTION, SOLUTION INTRAMUSCULAR; INTRAVENOUS at 03:01

## 2017-04-06 RX ADMIN — SODIUM CHLORIDE 1000 ML: 9 INJECTION, SOLUTION INTRAVENOUS at 01:46

## 2017-04-06 NOTE — TELEPHONE ENCOUNTER
Call to pt.  Advise per Dr Martin that his Hgb is actually 1 point higher than prior.  The results of UA are not yet back.    Pt verb understanding and reports went to ER last night 2nd abd pain.  Reports did not do UA here in office, but did do in ER.  Update to Dr Martin.

## 2017-04-06 NOTE — TELEPHONE ENCOUNTER
----- Message from Kimi Skelton sent at 4/6/2017  9:52 AM EDT -----  Zaira from Dr. Martin office called asking if we were on Epic and if we got a referral from Dr. Martin a week or so ago to schedule Hema Sweet to see Dr. Whitten. I said no it did not show up in our workque and it looked like the referral was not complete, but I could make him an appointment.  She said the appointment was for rectal bleeding. I told her Dr. Whitten was out of the office but I would make the appointment for next available and put him on the wait list with a high priority. She said this was fine and asked if I would I notify the patient of the appointment. I agreed. After trying to connect the referral to the appointment I  discovered it was filled out with Dr Whitten as the referred by  instead of referred to . I then called Zaira back and asked her to correct. She did and I connected the referral. After I connected the referral to the appointment, I was looking at the other tabs and noticed he had been in the hospital and Dr Whitten did hemorrhoid surgery on him on 3/26/17. I called the patient to clarify this and he said yes he had surgery by Dr Whitten and needed a post op visit which I scheduled for 4/11/17. I then call Zaira back and was given her voice mail. I left a message notifying her that Mr Sweet did not need an appointment for rectal bleeding, that Dr. Whitten had seen him in the hospital and did surgery and that he needed a post op visit, which I scheduled for 4/11/17.

## 2017-04-06 NOTE — TELEPHONE ENCOUNTER
Call to Dr Darlene Whitten's office @ [phone] and spoke with Kimi.  Advise that Dr Martin wants pt to be seen in next 1-2 weeks.  Kimi states Dr Whitten out of office and first available appt is 5/19/17 @ [phone].  She states will place pt on high priority for cancellation appts.  Kimi will contact pt to arrange.    Update to Dr Martin.

## 2017-04-06 NOTE — ED PROVIDER NOTES
EMERGENCY DEPARTMENT ENCOUNTER    CHIEF COMPLAINT  Chief Complaint: Dysuria  History given by: Patient  History limited by:   Room Number: 06/06  PMD: Trae Palacio MD      HPI:  Pt is a 26 y.o. male who presents complaining of dysuria since his hemorrhoidectomy on 3/29/17. Pt reports that he has gradually had more difficulty with urination, but is still able to produce urine and urinate. He also reports some pain to the lower abd. Pt denies any fever, chills, nausea or vomiting.    Duration: 8 days  Onset: gradual  Timing: constant  Quality: difficulty urinating  Intensity/Severity: moderate  Progression: unchanged  Associated Symptoms: abd pain  Aggravating Factors: none  Alleviating Factors: none  Previous Episodes: none  Treatment before arrival: none    PAST MEDICAL HISTORY  Active Ambulatory Problems     Diagnosis Date Noted   • Right upper quadrant abdominal pain 03/16/2017   • History of rectal bleeding 03/16/2017   • Family history of ulcerative colitis 03/16/2017   • Rectal bleeding 03/22/2017   • MANUEL (iron deficiency anemia) 03/23/2017   • External hemorrhoid 03/23/2017     Resolved Ambulatory Problems     Diagnosis Date Noted   • No Resolved Ambulatory Problems     Past Medical History:   Diagnosis Date   • Anemia    • Bell's palsy    • Chronic diarrhea    • External hemorrhoids    • Internal hemorrhoids    • Mononucleosis        PAST SURGICAL HISTORY  Past Surgical History:   Procedure Laterality Date   • COLONOSCOPY N/A 3/23/2017    Procedure: COLONOSCOPY TO CECUM AND INTO TERMINAL ILEUM WITH COLD BIOPSIES;  Surgeon: Evan Coates MD;  Location: [facility] [place] ENDOSCOPY;  Service: severo anal skin tag, single aphtha, IH, acute inflammation   • ENDOSCOPY N/A 3/24/2017    Procedure: ESOPHAGOGASTRODUODENOSCOPY with biopsies;  Surgeon: Evan Coates MD;  Location: [facility] ENDOSCOPY;  Service:    • HAND SURGERY     • HEMORRHOIDECTOMY N/A 3/26/2017    Procedure: HEMORRHOIDECTOMY;  Surgeon: Darlene W[place]  MD Fish;  Location: [facility] MAIN OR;  Service:    • SIGMOIDOSCOPY N/A 3/24/2017    Procedure: SIGMOIDOSCOPY FLEXIBLE into rectum;  Surgeon: Evan Coates MD;  Location: [facility] ENDOSCOPY;  Service:    • WISDOM TOOTH EXTRACTION         FAMILY HISTORY  Family History   Problem Relation Age of Onset   • Diabetes Mother    • Hypertension Mother    • Cancer Maternal Grandmother    • Hypertension Maternal Grandmother    • Ulcerative colitis Maternal Grandmother    • Stroke Maternal Grandfather    • Lung disease Maternal Grandfather    • Diabetes Maternal Grandfather    • Ulcerative colitis Maternal Aunt    • Inflammatory bowel disease Maternal Aunt        SOCIAL HISTORY  Social History     Social History   • Marital status: Single     Spouse name: N/A   • Number of children: N/A   • Years of education: N/A     Occupational History   • Not on file.     Social History Main Topics   • Smoking status: Never Smoker   • Smokeless tobacco: Not on file   • Alcohol use Yes      Comment: rare   • Drug use: No   • Sexual activity: Not on file     Other Topics Concern   • Not on file     Social History Narrative       ALLERGIES  Ceclor [cefaclor]    REVIEW OF SYSTEMS  Review of Systems   Constitutional: Negative for activity change, appetite change and fever.   HENT: Negative for congestion and sore throat.    Eyes: Negative.    Respiratory: Negative for cough and shortness of breath.    Cardiovascular: Negative for chest pain and leg swelling.   Gastrointestinal: Positive for abdominal pain. Negative for diarrhea and vomiting.   Endocrine: Negative.    Genitourinary: Positive for difficulty urinating and dysuria. Negative for decreased urine volume.   Musculoskeletal: Negative for neck pain.   Skin: Negative for rash and wound.   Allergic/Immunologic: Negative.    Neurological: Negative for weakness, numbness and headaches.   Hematological: Negative.    Psychiatric/Behavioral: Negative.    All other systems reviewed and are  negative.      PHYSICAL EXAM  ED Triage Vitals   Temp Heart Rate Resp BP SpO2   04/05/17 2102 04/05/17 2102 04/05/17 2102 04/05/17 2111 04/05/17 2102   97.3 °F (36.3 °C) 109 18 147/84 99 %      Temp src Heart Rate Source Patient Position BP Location FiO2 (%)   04/05/17 2102 -- -- -- --   Tympanic           Physical Exam   Constitutional: He is oriented to person, place, and time and well-developed, well-nourished, and in no distress.   HENT:   Head: Normocephalic and atraumatic.   Eyes: EOM are normal. Pupils are equal, round, and reactive to light.   Neck: Normal range of motion. Neck supple.   Cardiovascular: Normal rate, regular rhythm and normal heart sounds.    Pulmonary/Chest: Effort normal and breath sounds normal. No respiratory distress.   Abdominal: Soft. There is tenderness ( mild) in the suprapubic area. There is no rebound and no guarding.   Musculoskeletal: Normal range of motion. He exhibits no edema.   Neurological: He is alert and oriented to person, place, and time. He has normal sensation and normal strength.   Skin: Skin is warm and dry.   Psychiatric: Mood and affect normal.   Nursing note and vitals reviewed.      LAB RESULTS  Lab Results (last 24 hours)     Procedure Component Value Units Date/Time    CBC (No Diff) [[id number]]  (Abnormal) Collected:  04/05/17 1617    Specimen:  Blood Updated:  04/05/17 2025     WBC 10.09 10*3/mm3      RBC 5.57 10*6/mm3      Hemoglobin 11.7 (L) g/dL      Hematocrit 38.9 (L) %      MCV 69.8 (L) fL      MCH 21.0 (L) pg      MCHC 30.1 (L) g/dL      Platelets 409 10*3/mm3     Narrative:       Performed at:  [address] [place] [address]  [id number]  : Domingo De La O MD, Phone:  [phone]    Urinalysis With / Culture If Indicated [[id number]]  (Abnormal) Collected:  04/05/17 2203    Specimen:  Urine from Urine, Clean Catch Updated:  04/05/17 2254     Color, UA Yellow     Appearance, UA Clear     pH, UA <=5.0     Specific  Gravity, UA >=1.030     Glucose, UA Negative     Ketones, UA Trace (A)     Bilirubin, UA Negative     Blood, UA Negative     Protein, UA Negative     Leuk Esterase, UA Negative     Nitrite, UA Negative     Urobilinogen, UA 0.2 E.U./dL    Narrative:       Urine microscopic not indicated.    Urine Culture [[id number]] Collected:  04/05/17 2203    Specimen:  Urine from Urine, Clean Catch Updated:  04/06/17 0336    CBC & Differential [[id number]] Collected:  04/06/17 0145    Specimen:  Blood Updated:  04/06/17 0216    Narrative:       The following orders were created for panel order CBC & Differential.  Procedure                               Abnormality         Status                     ---------                               -----------         ------                     Scan Slide[[id number]]                                        Final result               CBC Auto Differential[[id number]]         Abnormal            Final result                 Please view results for these tests on the individual orders.    Comprehensive Metabolic Panel [[id number]]  (Abnormal) Collected:  04/06/17 0145    Specimen:  Blood Updated:  04/06/17 0215     Glucose 90 mg/dL      BUN 11 mg/dL      Creatinine 0.95 mg/dL      Sodium 140 mmol/L      Potassium 3.7 mmol/L      Chloride 100 mmol/L      CO2 24.3 mmol/L      Calcium 9.9 mg/dL      Total Protein 7.4 g/dL      Albumin 4.70 g/dL      ALT (SGPT) 32 U/L      AST (SGOT) 16 U/L      Alkaline Phosphatase 122 (H) U/L      Total Bilirubin 0.3 mg/dL      eGFR Non African Amer 96 mL/min/1.73      Globulin 2.7 gm/dL      A/G Ratio 1.7 g/dL      BUN/Creatinine Ratio 11.6     Anion Gap 15.7 mmol/L     Lipase [[id number]]  (Normal) Collected:  04/06/17 0145    Specimen:  Blood Updated:  04/06/17 0215     Lipase 21 U/L     CBC Auto Differential [[id number]]  (Abnormal) Collected:  04/06/17 0145    Specimen:  Blood Updated:  04/06/17 0216     WBC 8.21 10*3/mm3      RBC 5.02 10*6/mm3      Hemoglobin 10.9 (L)  g/dL      Hematocrit 35.1 (L) %      MCV 69.9 (L) fL      MCH 21.7 (L) pg      MCHC 31.1 (L) g/dL      RDW -- %       NOT ABLE TO CALCULATE        MPV 9.8 fL      Platelets 379 10*3/mm3      Neutrophil % 58.2 %      Lymphocyte % 30.7 %      Monocyte % 9.6 %      Eosinophil % 0.7 %      Basophil % 0.6 %      Immature Grans % 0.2 %      Neutrophils, Absolute 4.77 10*3/mm3      Lymphocytes, Absolute 2.52 10*3/mm3      Monocytes, Absolute 0.79 10*3/mm3      Eosinophils, Absolute 0.06 10*3/mm3      Basophils, Absolute 0.05 10*3/mm3      Immature Grans, Absolute 0.02 10*3/mm3      nRBC 0.0 /100 WBC     Scan Slide [[id number]] Collected:  04/06/17 0145    Specimen:  Blood Updated:  04/06/17 0216     Anisocytosis Mod/2+     Dacrocytes Slight/1+     Elliptocytes Slight/1+     Hypochromia Mod/2+     Microcytes Mod/2+     WBC Morphology Normal     Platelet Morphology Normal          I ordered the above labs and reviewed the results    RADIOLOGY  CT Abdomen Pelvis With Contrast - prostate normal. Bladder wall thickening. No acute findings.        I ordered the above noted radiological studies. Interpreted by radiologist. Discussed with radiologist (Dr. Brown). Reviewed by me in PACS.       PROCEDURES  Procedures      PROGRESS AND CONSULTS  ED Course   12:45 AM  Ordered IVF and blood work, UA. Ordered Morphine and Zofran.  2:15 AM  Ordered CT abd/pel.  3:22 AM  Discussed pt with Dr. Friedman. Dr. Friedman has seen and evaluated pt and agrees with tx plan.       MEDICAL DECISION MAKING      MDM  Number of Diagnoses or Management Options     Amount and/or Complexity of Data Reviewed  Review and summarize past medical records: yes (Admitted 3/22 for GI bleed. Hemorrhoidectomy on 3/27)           DIAGNOSIS  Final diagnoses:   Lower abdominal pain   Dysuria       DISPOSITION  DISCHARGE    Patient discharged in stable condition.    Reviewed implications of results, diagnosis, meds, responsibility to follow up, warning signs and symptoms  of possible worsening, potential complications and reasons to return to ER.    Patient/Family voiced understanding of above instructions.    Discussed plan for discharge, as there is no emergent indication for admission.  Pt/family is agreeable and understands need for follow up and repeat testing.  Pt is aware that discharge does not mean that nothing is wrong but it indicates no emergency is present that requires admission and they must continue care with follow-up as given below or physician of their choice.     FOLLOW-UP  Babar Etienne MD  [address] [address]  [phone]    Schedule an appointment as soon as possible for a visit  For further evaluation and treatment         Medication List      Changed          * oxyCODONE-acetaminophen 7.5-325 MG per tablet   Commonly known as:  PERCOCET   Take 1 tablet by mouth Every 4 (Four) Hours As Needed for Severe Pain   (7-10).   What changed:  Another medication with the same name was added. Make sure   you understand how and when to take each.       * oxyCODONE-acetaminophen 7.5-325 MG per tablet   Commonly known as:  PERCOCET   Take 1 tablet by mouth Every 6 (Six) Hours As Needed for Severe Pain   (7-10).   What changed:  You were already taking a medication with the same name,   and this prescription was added. Make sure you understand how and when to   take each.       * Notice:  This list has 2 medication(s) that are the same as other   medications prescribed for you. Read the directions carefully, and ask   your doctor or other care provider to review them with you.            Latest Documented Vital Signs:  As of 3:42 AM  BP- 127/81 HR- 109 Temp- 97.3 °F (36.3 °C) (Tympanic) O2 sat- 100%    --  Documentation assistance provided by crescencio Moss for Kvng Villagran PA-C.  Information recorded by the crescencio was done at my direction and has been verified and validated by me.       Roverto Moss  04/06/17 0309       Roverto  Ajay  04/06/17 0324       DIDI Woodard III  04/06/17 0342

## 2017-04-06 NOTE — DISCHARGE INSTRUCTIONS
Return to the ER with any further concerns, should your condition change/worsen, or should you develop a fever.

## 2017-04-06 NOTE — ED NOTES
Pt updated on plan of care. No changes in symptoms or pain at this time      Toya Young RN  04/06/17 0003

## 2017-04-06 NOTE — TELEPHONE ENCOUNTER
----- Message from Keiry Martin MD sent at 4/6/2017 12:42 PM EDT -----  His hemoglobin is actually 1. higher than prior.  The results of his urinalysis are not back yet.

## 2017-04-06 NOTE — ED PROVIDER NOTES
Pt presents to the ED c/o suprapubic abd pain that began 5 days ago, and has progressively worsened. He also complains of nausea, but denies fever, vomiting or back pain. On exam, A&Ox3, no acute distress. Heart is RRR and without murmur, lungs are clear bilaterally. Moderate epigastric and suprapubic tenderness, positive bowel sounds. I agree with the plan for labs for further evaluation.     Attestation:  I supervised care provided by the midlevel provider.    We have discussed this patient's history, physical exam, and treatment plan.    I have reviewed the note and personally saw and examined the patient and agree with the plan of care.  I agree with the midlevel provider's findings and plan. I reviewed the midlevel providers note.     Documentation assistance provided by crescencio Wright for Dr Friedman Information recorded by the crescencio was done at my direction and has been verified and validated by me.     Agatha Wright  04/06/17 0158       Dami Friedman MD  04/06/17 0705

## 2017-04-06 NOTE — ED NOTES
Pt had hemorrhoidectomy 10 days ago had a follow up with Dr Manzano today had a CBC in office in which results are in the computer. Pt c/o low pelvic pain and sharp pain with urination.     Toya Young RN  04/05/17 1344

## 2017-04-07 LAB — BACTERIA SPEC AEROBE CULT: NO GROWTH

## 2017-04-10 ENCOUNTER — TELEPHONE (OUTPATIENT)
Dept: SOCIAL WORK | Facility: HOSPITAL | Age: 27
End: 2017-04-10

## 2017-04-10 ENCOUNTER — OFFICE VISIT (OUTPATIENT)
Dept: FAMILY MEDICINE CLINIC | Facility: CLINIC | Age: 27
End: 2017-04-10

## 2017-04-10 VITALS
HEART RATE: 75 BPM | TEMPERATURE: 98.7 F | DIASTOLIC BLOOD PRESSURE: 82 MMHG | SYSTOLIC BLOOD PRESSURE: 132 MMHG | BODY MASS INDEX: 26.36 KG/M2 | RESPIRATION RATE: 16 BRPM | HEIGHT: 66 IN | WEIGHT: 164 LBS

## 2017-04-10 DIAGNOSIS — K64.4 EXTERNAL HEMORRHOID: ICD-10-CM

## 2017-04-10 DIAGNOSIS — Z09 HOSPITAL DISCHARGE FOLLOW-UP: Primary | ICD-10-CM

## 2017-04-10 DIAGNOSIS — D50.8 OTHER IRON DEFICIENCY ANEMIA: ICD-10-CM

## 2017-04-10 DIAGNOSIS — K62.5 RECTAL BLEEDING: ICD-10-CM

## 2017-04-10 PROCEDURE — 99213 OFFICE O/P EST LOW 20 MIN: CPT | Performed by: FAMILY MEDICINE

## 2017-04-10 RX ORDER — FERROUS SULFATE 325(65) MG
325 TABLET ORAL
Qty: 30 TABLET | Refills: 2 | Status: SHIPPED | OUTPATIENT
Start: 2017-04-10 | End: 2017-05-22 | Stop reason: SDUPTHER

## 2017-04-10 NOTE — TELEPHONE ENCOUNTER
"ER F/U phone call:  Pt states that he is doing much \"better\". Saw urologist last week, saw his PCP today. No other questions or concerns voiced at this time. Nataliia Garza RN    "

## 2017-04-10 NOTE — PROGRESS NOTES
Chief Complaint   Patient presents with   • Hospital Follow up       Subjective    This patient presents the office follow-up on recent hospital visits.  He was admitted on March 22, 2017 and discharged on March 28, 2017.  Admission diagnosis was rectal bleeding and external hemorrhoid that was repaired surgically.  He did have significant anemia from the bleed.  He got 3 units of packed red blood cells transfusion and iron transfusion.  His most recent hemoglobin done at emergency room visit was 10.9.  Once he was discharged he developed some urinary pain with and went back to the emergency room on April 6 and no definitive diagnosis was found.  He was referred to urology and he has had that outpatient appointment and was felt to be a complication from his hemorrhoid surgery.  He has follow-up with the colorectal surgeon tomorrow and also with the urologist next week.  He is due to see gastroenterology later this month.  Hospital Data Reviewed:  Date of Admission: 3/22/2017  Date of Discharge: 3/28/2017  Discharge Diagnosis:        Active Hospital Problems (** Indicates Principal Problem)     Diagnosis Date Noted   • **Rectal bleeding [K62.5] 03/22/2017   • MANUEL (iron deficiency anemia) [D50.9] 03/23/2017   • External hemorrhoid [K64.4] 03/23/2017         Hospital Course from [facility] note 3/28/2017  Patient is a 26 y.o. male who presented with multiple months of rectal bleeding. He presented to his family physician and was found to have a hemoglobin of 7.2. A CT scan of abdomen and pelvis showed some thickening of the rectum.  He was seen in consult by Dr. Martin who recommended an EGD and colonoscopy.  Both of these procedures were done and were without evidence of abnormality other than hemorrhoids. The patient continued to have some bright red blood per rectum. He had received 2 units of packed cells when his hemoglobin reached a value of 7. There was iron deficiency present on his laboratory and he received 3 iron  infusions. Anusol suppositories were provided for the hemorrhoids without any improvement. The bleeding seemed to stop initially and then it reoccurred. An evaluation for inflammatory bowel disease was negative.  He was seen by Dr. Michelle Whitten who performed a hemorrhoidectomy of both internal and external hemorrhoids. Postoperatively the patient had a substantial amount of pain requiring intravenous narcotics in addition to stool softeners and oral narcotics. He improved over the next number of days with his hemoglobin at discharge being 8.8. He was stable and able to be discharged home. He would return to work on 4/3/17. He was to use a doughnut cushion for sitting. He needed to have a CBC next week at Dr. Palacio's office. He was to continue to use MiraLAX twice a day while taking pain medicine. There was no limitation on his diet. He was instructed not to drive while taking narcotics.  Lipase   Order: [id number]   Status:  Final result   Visible to patient:  Yes (MyCDexter)      Ref Range & Units 4d ago     Lipase 13 - 60 U/L 21   Resulting Agency  [facility]      Specimen Collected: 04/06/17  1:45 AM Last Resulted: 04/06/17  2:15 AM                Comprehensive Metabolic Panel   Order: [id number]   Status:  Final result   Visible to patient:  Yes (MyCDexter)      Ref Range & Units 4d ago     Glucose 65 - 99 mg/dL 90   BUN 6 - 20 mg/dL 11   Creatinine 0.76 - 1.27 mg/dL 0.95   Sodium 136 - 145 mmol/L 140   Potassium 3.5 - 5.2 mmol/L 3.7   Chloride 98 - 107 mmol/L 100   CO2 22.0 - 29.0 mmol/L 24.3   Calcium 8.6 - 10.5 mg/dL 9.9   Total Protein 6.0 - 8.5 g/dL 7.4   Albumin 3.50 - 5.20 g/dL 4.70   ALT (SGPT) 1 - 41 U/L 32   AST (SGOT) 1 - 40 U/L 16   Alkaline Phosphatase 39 - 117 U/L 122 (H)   Total Bilirubin 0.1 - 1.2 mg/dL 0.3   eGFR Non African Amer >60 mL/min/1.73 96   Globulin gm/dL 2.7   A/G Ratio g/dL 1.7   BUN/Creatinine Ratio 7.0 - 25.0 11.6   Anion Gap mmol/L 15.7   Resulting Agency   [facility]      Specimen  Collected: 04/06/17  1:45 AM Last Resulted: 04/06/17  2:15 AM              CBC Auto Differential   Order: [id number] - Part of Panel Order [id number]   Status:  Final result   Visible to patient:  Yes (Darlin)      Ref Range & Units 4d ago     WBC 4.50 - 10.70 10*3/mm3 8.21   RBC 4.60 - 6.00 10*6/mm3 5.02   Hemoglobin 13.7 - 17.6 g/dL 10.9 (L)   Hematocrit 40.4 - 52.2 % 35.1 (L)   MCV 79.8 - 96.2 fL 69.9 (L)   MCH 27.0 - 32.7 pg 21.7 (L)   MCHC 32.6 - 36.4 g/dL 31.1 (L)   RDW 11.5 - 14.5 %    Comments: NOT ABLE TO CALCULATE   MPV 6.0 - 12.0 fL 9.8   Platelets 140 - 500 10*3/mm3 379   Neutrophil % 42.7 - 76.0 % 58.2   Lymphocyte % 19.6 - 45.3 % 30.7   Monocyte % 5.0 - 12.0 % 9.6   Eosinophil % 0.3 - 6.2 % 0.7   Basophil % 0.0 - 1.5 % 0.6   Immature Grans % 0.0 - 0.5 % 0.2   Neutrophils, Absolute 1.90 - 8.10 10*3/mm3 4.77   Lymphocytes, Absolute 0.90 - 4.80 10*3/mm3 2.52   Monocytes, Absolute 0.20 - 1.20 10*3/mm3 0.79   Eosinophils, Absolute 0.00 - 0.70 10*3/mm3 0.06   Basophils, Absolute 0.00 - 0.20 10*3/mm3 0.05   Immature Grans, Absolute 0.00 - 0.03 10*3/mm3 0.02   nRBC 0.0 - 0.0 /100 WBC 0.0   Resulting Agency  [facility]      Specimen Collected: 04/06/17  1:45 AM Last Resulted: 04/06/17                Urine Culture   Order: [id number]   Status:  Final result   Visible to patient:  Yes (Darlin)   Specimen Information: Urine, Clean Catch; Urine        Culture   No growth      Resulting Agency: [facility]      Specimen Collected: 04/05/17 10:03 PM Last Resulted: 04/07/17  7:00 AM              CT SCAN OF THE ABDOMEN AND PELVIS WITH INTRAVENOUS CONTRAST      HISTORY: Recent surgery for hemorrhoids. Now with low abdominal pain and  diarrhea.      FINDINGS: The CT scan was performed as an emergency procedure through  the abdomen and pelvis with intravenous contrast and compared to the  study of 03/22/2017. The following findings are present:  1. The lung bases are clear. The liver, spleen, pancreas, both adrenal  glands,  "and both kidneys appear normal. The gallbladder shows no  gallstones or wall thickening.  2. The aorta and retroperitoneal structures appear normal. The large and  small bowel loops are normal in caliber and show no inflammatory change.  3. In the pelvis, the anterior wall of the urinary bladder is somewhat  thickened in appearance. This is nonspecific but could relate to an  element of cystitis. The rectum and perirectal fat planes are  unremarkable.      This report was finalized on 4/6/2017 8:54 PM by Dr. Jose Brown MD.        Review of Systems   Constitutional: Positive for fatigue.   Gastrointestinal: Positive for anal bleeding.        Rectal pain   Neurological: Negative for dizziness.       Objective   /82  Pulse 75  Temp 98.7 °F (37.1 °C) (Oral)   Resp 16  Ht 66\" (167.6 cm)  Wt 164 lb (74.4 kg)  BMI 26.47 kg/m2  Body mass index is 26.47 kg/(m^2).  Physical Exam   Constitutional: He is cooperative. No distress.   Eyes: Conjunctivae and lids are normal.   Neck: Carotid bruit is not present. No tracheal deviation present.   Cardiovascular: Normal rate, regular rhythm and normal heart sounds.    No murmur heard.  Pulmonary/Chest: Effort normal and breath sounds normal.   Neurological: He is alert. He is not disoriented.   Skin: Skin is warm and dry.   Psychiatric: He has a normal mood and affect. His speech is normal and behavior is normal.   Vitals reviewed.      Assessment/Plan     Problem List Items Addressed This Visit        Cardiovascular and Mediastinum    External hemorrhoid       Digestive    Rectal bleeding    Relevant Medications    ferrous sulfate 325 (65 FE) MG tablet       Hematopoietic and Hemostatic    MANUEL (iron deficiency anemia)    Relevant Medications    ferrous sulfate 325 (65 FE) MG tablet    Other Relevant Orders    CBC & Differential    Ferritin    Iron Profile      Other Visit Diagnoses     Hospital discharge follow-up    -  Primary        Current medications have been " reviewed and reconciled with discharge medications during today's encounter visit.  Outpatient Encounter Prescriptions as of 4/10/2017   Medication Sig Dispense Refill   • dicyclomine (BENTYL) 20 MG tablet Take 1 tablet by mouth Every 6 (Six) Hours As Needed (cramping) for up to 30 days. 90 tablet 1   • lidocaine (XYLOCAINE) 5 % ointment Apply  topically 3 (Three) Times a Day. 50 g 0   • oxyCODONE-acetaminophen (PERCOCET) 7.5-325 MG per tablet Take 1 tablet by mouth Every 4 (Four) Hours As Needed for Severe Pain (7-10). 40 tablet 0   • phenylephrine-cocoa Butter (PREPARATION H) 0.25-88.44 % suppository suppository Insert 1 suppository into the rectum Every 6 (Six) Hours As Needed for Hemorrhoids. 30 suppository 1   • ferrous sulfate 325 (65 FE) MG tablet Take 1 tablet by mouth Daily With Breakfast for 90 days. 30 tablet 2   • polyethylene glycol (MIRALAX) packet Take 17 g by mouth 2 (Two) Times a Day. (Patient taking differently: Take 17 g by mouth As Needed.) 225 g 0   • [DISCONTINUED] oxyCODONE-acetaminophen (PERCOCET) 7.5-325 MG per tablet Take 1 tablet by mouth Every 6 (Six) Hours As Needed for Severe Pain (7-10). 15 tablet 0     No facility-administered encounter medications on file as of 4/10/2017.        Orders Placed This Encounter   Procedures   • Ferritin     Standing Status:   Future     Standing Expiration Date:   10/7/2017   • Iron Profile     Standing Status:   Future     Standing Expiration Date:   10/7/2017       Continue with current treatment plan.         F/U in 6 weeks

## 2017-04-11 ENCOUNTER — OFFICE VISIT (OUTPATIENT)
Dept: SURGERY | Facility: CLINIC | Age: 27
End: 2017-04-11

## 2017-04-11 VITALS
OXYGEN SATURATION: 99 % | BODY MASS INDEX: 25.89 KG/M2 | WEIGHT: 161.1 LBS | HEIGHT: 66 IN | SYSTOLIC BLOOD PRESSURE: 130 MMHG | HEART RATE: 72 BPM | DIASTOLIC BLOOD PRESSURE: 86 MMHG | TEMPERATURE: 98.5 F

## 2017-04-11 DIAGNOSIS — K64.8 INTERNAL HEMORRHOIDS WITH COMPLICATION: Primary | ICD-10-CM

## 2017-04-11 DIAGNOSIS — K64.4 HEMORRHOIDS, EXTERNAL, WITH COMPLICATION: ICD-10-CM

## 2017-04-11 PROCEDURE — 99024 POSTOP FOLLOW-UP VISIT: CPT | Performed by: COLON & RECTAL SURGERY

## 2017-04-11 NOTE — PROGRESS NOTES
Hema Sweet is a 26 y.o. male who is seen s/p hemorrhoidectomy 3/26/2017    HPI:    Taking tylenol for pain    Stopped Miralax, because he was having diarrhea    Stools now more firm, which is painful    Using lidocaine and ice, which are helpful    Having occasional bleeding, although less than before: amount varies from small amount to moderate amount per pt    Saw Dr. Etienne for issues urinating    Past Medical History:   Diagnosis Date   • Allergic rhinitis    • Anemia     IRON DEF, D/T BLOOD LOSS   • Bell's palsy    • Chronic diarrhea    • External hemorrhoids    • History of transfusion of packed red blood cells 2017   • Internal hemorrhoids    • Migraine    • Mononucleosis        Past Surgical History:   Procedure Laterality Date   • COLONOSCOPY N/A 3/23/2017    Procedure: COLONOSCOPY TO CECUM AND INTO TERMINAL ILEUM WITH COLD BIOPSIES;  Surgeon: Evan Coates MD;  Location: [facility] ENDOSCOPY;  Service: severo anal skin tag, single aphtha, IH, acute inflammation   • ENDOSCOPY N/A 3/24/2017    Procedure: ESOPHAGOGASTRODUODENOSCOPY with biopsies;  Surgeon: Evan Coates MD;  Location: [facility] ENDOSCOPY;  Service:    • HAND SURGERY     • HEMORRHOIDECTOMY N/A 3/26/2017    Procedure: HEMORRHOIDECTOMY;  Surgeon: Darlene Whitten MD;  Location: [facility] MAIN OR;  Service:    • SIGMOIDOSCOPY N/A 3/24/2017    Procedure: SIGMOIDOSCOPY FLEXIBLE into rectum;  Surgeon: Evan Coates MD;  Location: [facility] ENDOSCOPY;  Service:    • WISDOM TOOTH EXTRACTION         Social History:   reports that he has never smoked. He has never used smokeless tobacco. He reports that he drinks alcohol. He reports that he does not use illicit drugs.      Marriage status: Single    Family History   Problem Relation Age of Onset   • Diabetes Mother    • Hypertension Mother    • Cancer Maternal Grandmother    • Hypertension Maternal Grandmother    • Ulcerative colitis Maternal Grandmother    • Stroke Maternal Grandfather     • Lung disease Maternal Grandfather    • Diabetes Maternal Grandfather    • Ulcerative colitis Maternal Aunt    • Inflammatory bowel disease Maternal Aunt          Current Outpatient Prescriptions:   •  dicyclomine (BENTYL) 20 MG tablet, Take 1 tablet by mouth Every 6 (Six) Hours As Needed (cramping) for up to 30 days., Disp: 90 tablet, Rfl: 1  •  ferrous sulfate 325 (65 FE) MG tablet, Take 1 tablet by mouth Daily With Breakfast for 90 days., Disp: 30 tablet, Rfl: 2  •  lidocaine (XYLOCAINE) 5 % ointment, Apply  topically 3 (Three) Times a Day., Disp: 50 g, Rfl: 0  •  oxyCODONE-acetaminophen (PERCOCET) 7.5-325 MG per tablet, Take 1 tablet by mouth Every 4 (Four) Hours As Needed for Severe Pain (7-10)., Disp: 40 tablet, Rfl: 0  •  phenylephrine-cocoa Butter (PREPARATION H) 0.25-88.44 % suppository suppository, Insert 1 suppository into the rectum Every 6 (Six) Hours As Needed for Hemorrhoids., Disp: 30 suppository, Rfl: 1  •  polyethylene glycol (MIRALAX) packet, Take 17 g by mouth 2 (Two) Times a Day. (Patient taking differently: Take 17 g by mouth As Needed.), Disp: 225 g, Rfl: 0    Allergy  Ceclor [cefaclor]    Review of Systems   Constitution: Positive for weakness. Negative for decreased appetite and weight gain.   HENT: Positive for headaches. Negative for congestion, hearing loss and hoarse voice.    Eyes: Negative for blurred vision, discharge and visual disturbance.   Cardiovascular: Negative for chest pain, cyanosis and leg swelling.   Respiratory: Negative for cough, shortness of breath, sleep disturbances due to breathing and snoring.    Endocrine: Negative for cold intolerance and heat intolerance.   Hematologic/Lymphatic: Does not bruise/bleed easily.   Skin: Negative for itching, poor wound healing and skin cancer.   Musculoskeletal: Negative for arthritis, back pain, joint pain and joint swelling.   Gastrointestinal: Positive for abdominal pain. Negative for change in bowel habit, bowel incontinence  and constipation.   Genitourinary: Positive for dysuria. Negative for bladder incontinence and hematuria.   Neurological: Negative for brief paralysis, excessive daytime sleepiness, dizziness, focal weakness and light-headedness.   Psychiatric/Behavioral: Negative for altered mental status and hallucinations. The patient does not have insomnia.    Allergic/Immunologic: Negative for HIV exposure and persistent infections.       Vitals:    04/11/17 1124   BP: 130/86   Pulse: 72   Temp: 98.5 °F (36.9 °C)   SpO2: 99%     Body mass index is 26 kg/(m^2).    Physical Exam   Constitutional: He is oriented to person, place, and time. He appears well-developed and well-nourished. No distress.   HENT:   Head: Normocephalic and atraumatic.   Nose: Nose normal.   Mouth/Throat: Oropharynx is clear and moist.   Eyes: Conjunctivae and EOM are normal. Pupils are equal, round, and reactive to light.   Neck: Normal range of motion. No tracheal deviation present.   Pulmonary/Chest: Effort normal and breath sounds normal. No respiratory distress.   Abdominal: Soft. Bowel sounds are normal. He exhibits no distension.   Genitourinary:   Genitourinary Comments: Perianal exam: external : mild edema. No erythema.  No blood or drainage.    Musculoskeletal: Normal range of motion. He exhibits no edema or deformity.   Neurological: He is alert and oriented to person, place, and time. No cranial nerve deficit. Coordination and gait normal.   Skin: Skin is warm and dry.   Psychiatric: He has a normal mood and affect. His behavior is normal. Judgment normal.       Review of Medical Record:  Requesting Dr. Etienne records    Assessment:  1. Internal hemorrhoids with complication    s/p hemorrhoidectomy 3/26/2017    Plan:    -Miralax 1/2 dose qd  -daily fiber regimen  -recommended sitz baths  -samples Micort and instructions given  -keep f/u with Dr. Martin  -keep f/u with Dr. Etienne  -all questions answered. If patient has further questions or  concerns, he will call or come in    RTC 3 weeks    Scribed for Darlene Whitten MD by Kimberly Davis PA-C 4/11/2017    This patient was evaluated by me, recommendations made, documentation reviewed, edited, and revised by me, MD RUCHI Castillo Dragon/Transcription disclaimer:   Much of this encounter note is an electronic transcription/translation of spoken language to printed text. The electronic translation of spoken language may permit erroneous, or at times, nonsensical words or phrases to be inadvertently transcribed; Although I have reviewed the note for such errors, some may still exist.

## 2017-04-24 ENCOUNTER — RESULTS ENCOUNTER (OUTPATIENT)
Dept: FAMILY MEDICINE CLINIC | Facility: CLINIC | Age: 27
End: 2017-04-24

## 2017-04-24 DIAGNOSIS — D50.8 OTHER IRON DEFICIENCY ANEMIA: ICD-10-CM

## 2017-05-02 ENCOUNTER — TELEPHONE (OUTPATIENT)
Dept: GASTROENTEROLOGY | Facility: CLINIC | Age: 27
End: 2017-05-02

## 2017-05-02 ENCOUNTER — OFFICE VISIT (OUTPATIENT)
Dept: SURGERY | Facility: CLINIC | Age: 27
End: 2017-05-02

## 2017-05-02 VITALS
HEART RATE: 88 BPM | OXYGEN SATURATION: 98 % | WEIGHT: 165.5 LBS | DIASTOLIC BLOOD PRESSURE: 80 MMHG | SYSTOLIC BLOOD PRESSURE: 122 MMHG | HEIGHT: 66 IN | BODY MASS INDEX: 26.6 KG/M2

## 2017-05-02 DIAGNOSIS — K64.8 INTERNAL HEMORRHOIDS WITH COMPLICATION: Primary | ICD-10-CM

## 2017-05-02 DIAGNOSIS — K64.4 HEMORRHOIDS, EXTERNAL, WITH COMPLICATION: ICD-10-CM

## 2017-05-02 PROCEDURE — 99024 POSTOP FOLLOW-UP VISIT: CPT | Performed by: COLON & RECTAL SURGERY

## 2017-05-19 ENCOUNTER — OFFICE VISIT (OUTPATIENT)
Dept: GASTROENTEROLOGY | Facility: CLINIC | Age: 27
End: 2017-05-19

## 2017-05-19 VITALS
SYSTOLIC BLOOD PRESSURE: 136 MMHG | DIASTOLIC BLOOD PRESSURE: 80 MMHG | WEIGHT: 167.4 LBS | HEIGHT: 66 IN | BODY MASS INDEX: 26.9 KG/M2

## 2017-05-19 DIAGNOSIS — D50.0 IRON DEFICIENCY ANEMIA DUE TO CHRONIC BLOOD LOSS: Primary | ICD-10-CM

## 2017-05-19 DIAGNOSIS — R10.13 DYSPEPSIA: ICD-10-CM

## 2017-05-19 DIAGNOSIS — K64.8 OTHER HEMORRHOIDS: ICD-10-CM

## 2017-05-19 LAB
BASOPHILS # BLD AUTO: 0.04 10*3/MM3 (ref 0–0.2)
BASOPHILS NFR BLD AUTO: 0.6 % (ref 0–1.5)
DIFFERENTIAL COMMENT: NORMAL
EOSINOPHIL # BLD AUTO: 0.21 10*3/MM3 (ref 0–0.7)
EOSINOPHIL NFR BLD AUTO: 3 % (ref 0.3–6.2)
ERYTHROCYTE [DISTWIDTH] IN BLOOD BY AUTOMATED COUNT: 24.4 % (ref 11.5–14.5)
FERRITIN SERPL-MCNC: 26.69 NG/ML (ref 30–400)
HCT VFR BLD AUTO: 43 % (ref 40.4–52.2)
HGB BLD-MCNC: 13.5 G/DL (ref 13.7–17.6)
IMM GRANULOCYTES # BLD: 0.03 10*3/MM3 (ref 0–0.03)
IMM GRANULOCYTES NFR BLD: 0.4 % (ref 0–0.5)
IRON SATN MFR SERPL: 9 % (ref 20–50)
IRON SERPL-MCNC: 43 MCG/DL (ref 59–158)
LYMPHOCYTES # BLD AUTO: 2.24 10*3/MM3 (ref 0.9–4.8)
LYMPHOCYTES NFR BLD AUTO: 31.7 % (ref 19.6–45.3)
MCH RBC QN AUTO: 24.3 PG (ref 27–32.7)
MCHC RBC AUTO-ENTMCNC: 31.4 G/DL (ref 32.6–36.4)
MCV RBC AUTO: 77.3 FL (ref 79.8–96.2)
MONOCYTES # BLD AUTO: 0.5 10*3/MM3 (ref 0.2–1.2)
MONOCYTES NFR BLD AUTO: 7.1 % (ref 5–12)
NEUTROPHILS # BLD AUTO: 4.04 10*3/MM3 (ref 1.9–8.1)
NEUTROPHILS NFR BLD AUTO: 57.2 % (ref 42.7–76)
NRBC BLD AUTO-RTO: 0 /100 WBC (ref 0–0)
PLATELET # BLD AUTO: 272 10*3/MM3 (ref 140–500)
PLATELET BLD QL SMEAR: NORMAL
RBC # BLD AUTO: 5.56 10*6/MM3 (ref 4.6–6)
RBC MORPH BLD: NORMAL
TIBC SERPL-MCNC: 468 MCG/DL
UIBC SERPL-MCNC: 425 MCG/DL
WBC # BLD AUTO: 7.06 10*3/MM3 (ref 4.5–10.7)

## 2017-05-19 PROCEDURE — 99214 OFFICE O/P EST MOD 30 MIN: CPT | Performed by: INTERNAL MEDICINE

## 2017-05-19 RX ORDER — DOCUSATE SODIUM 100 MG/1
100 CAPSULE, LIQUID FILLED ORAL DAILY
Qty: 60 CAPSULE | Refills: 2 | Status: SHIPPED | OUTPATIENT
Start: 2017-05-19 | End: 2018-07-05

## 2017-05-19 RX ORDER — PANTOPRAZOLE SODIUM 40 MG/1
40 TABLET, DELAYED RELEASE ORAL DAILY
Qty: 60 TABLET | Refills: 1 | Status: SHIPPED | OUTPATIENT
Start: 2017-05-19 | End: 2019-07-17

## 2017-05-19 RX ORDER — DICYCLOMINE HCL 20 MG
20 TABLET ORAL EVERY 6 HOURS
COMMUNITY
End: 2017-09-22 | Stop reason: ALTCHOICE

## 2017-05-19 RX ORDER — LIDOCAINE 50 MG/G
OINTMENT TOPICAL 3 TIMES DAILY
Qty: 50 G | Refills: 0 | Status: SHIPPED | OUTPATIENT
Start: 2017-05-19 | End: 2017-06-20 | Stop reason: SDUPTHER

## 2017-05-22 ENCOUNTER — OFFICE VISIT (OUTPATIENT)
Dept: FAMILY MEDICINE CLINIC | Facility: CLINIC | Age: 27
End: 2017-05-22

## 2017-05-22 ENCOUNTER — TELEPHONE (OUTPATIENT)
Dept: GASTROENTEROLOGY | Facility: CLINIC | Age: 27
End: 2017-05-22

## 2017-05-22 VITALS
TEMPERATURE: 98 F | SYSTOLIC BLOOD PRESSURE: 114 MMHG | BODY MASS INDEX: 26.84 KG/M2 | DIASTOLIC BLOOD PRESSURE: 69 MMHG | RESPIRATION RATE: 16 BRPM | HEART RATE: 63 BPM | WEIGHT: 167 LBS | HEIGHT: 66 IN

## 2017-05-22 DIAGNOSIS — D50.8 OTHER IRON DEFICIENCY ANEMIA: Primary | ICD-10-CM

## 2017-05-22 PROCEDURE — 99213 OFFICE O/P EST LOW 20 MIN: CPT | Performed by: FAMILY MEDICINE

## 2017-05-22 RX ORDER — FERROUS SULFATE 325(65) MG
325 TABLET ORAL 2 TIMES DAILY
Qty: 60 TABLET | Refills: 5 | Status: SHIPPED | OUTPATIENT
Start: 2017-05-22 | End: 2017-11-17

## 2017-05-22 RX ORDER — FERROUS SULFATE 325(65) MG
325 TABLET ORAL 2 TIMES DAILY
Qty: 60 TABLET | Refills: 5 | Status: SHIPPED | OUTPATIENT
Start: 2017-05-22 | End: 2017-05-22 | Stop reason: SDUPTHER

## 2017-06-20 ENCOUNTER — OFFICE VISIT (OUTPATIENT)
Dept: SURGERY | Facility: CLINIC | Age: 27
End: 2017-06-20

## 2017-06-20 VITALS
HEART RATE: 80 BPM | TEMPERATURE: 98.7 F | DIASTOLIC BLOOD PRESSURE: 70 MMHG | SYSTOLIC BLOOD PRESSURE: 112 MMHG | OXYGEN SATURATION: 99 % | BODY MASS INDEX: 27.1 KG/M2 | WEIGHT: 167.9 LBS

## 2017-06-20 DIAGNOSIS — K64.4 HEMORRHOIDS, EXTERNAL, WITH COMPLICATION: ICD-10-CM

## 2017-06-20 DIAGNOSIS — K64.8 INTERNAL HEMORRHOIDS WITH COMPLICATION: Primary | ICD-10-CM

## 2017-06-20 DIAGNOSIS — K64.8 OTHER HEMORRHOIDS: ICD-10-CM

## 2017-06-20 PROCEDURE — 99024 POSTOP FOLLOW-UP VISIT: CPT | Performed by: COLON & RECTAL SURGERY

## 2017-06-20 RX ORDER — LIDOCAINE 50 MG/G
OINTMENT TOPICAL 3 TIMES DAILY
Qty: 50 G | Refills: 0 | Status: SHIPPED | OUTPATIENT
Start: 2017-06-20 | End: 2017-06-20 | Stop reason: SDUPTHER

## 2017-06-20 RX ORDER — LIDOCAINE 50 MG/G
OINTMENT TOPICAL 3 TIMES DAILY
Qty: 50 G | Refills: 0 | Status: SHIPPED | OUTPATIENT
Start: 2017-06-20 | End: 2019-07-17

## 2017-06-20 NOTE — PROGRESS NOTES
Hema Sweet is a 26 y.o. male in for follow up of Internal hemorrhoids with complication    Hemorrhoids, external, with complication  s/p hemorrhoidectomy 3/26/2017    On bentyl, colace, and protonix from Dr. Martin    PCP doubled iron    Stools harder again    Pain only with hard BMs    Very small amount of blood    /70 (BP Location: Right arm, Patient Position: Sitting)  Pulse 80  Temp 98.7 °F (37.1 °C)  Wt 167 lb 14.4 oz (76.2 kg)  SpO2 99%  BMI 27.1 kg/m2  Body mass index is 27.1 kg/(m^2).      PE:  Physical Exam   Constitutional: He appears well-developed. No distress.   HENT:   Head: Normocephalic and atraumatic.   Abdominal: Soft. He exhibits no distension.   Genitourinary:   Genitourinary Comments: Perianal exam: external: small left anterior and small right anterior tags.  No blood.  No edema or erythema   Musculoskeletal: Normal range of motion.   Neurological: He is alert.   Psychiatric: Thought content normal.     H/H 5/19/2017  13.5/43.0    Assessment:   1. Internal hemorrhoids with complication    2. Hemorrhoids, external, with complication     s/p hemorrhoidectomy 3/26/2017    Plan:    -doing well post-op  -recommended daily fiber supplements and daily stool softeners  -recommended pt d/c po iron, as anemia mild, and iron causing issues with bowel movements  -rx refill lidocaine  -call or come in if any questions or concerns    RTC prn    Scribed for Darlene Whitten MD by Kimberly Davis PA-C 6/20/2017  This patient was evaluated by me, recommendations made, documentation reviewed, edited, and revised by me, Darlene Whitten MD          EMR Dragon/Transcription disclaimer:   Much of this encounter note is an electronic transcription/translation of spoken language to printed text. The electronic translation of spoken language may permit erroneous, or at times, nonsensical words or phrases to be inadvertently transcribed; Although I have reviewed the note for such errors, some may still  exist.

## 2017-06-21 ENCOUNTER — PATIENT MESSAGE (OUTPATIENT)
Dept: FAMILY MEDICINE CLINIC | Facility: CLINIC | Age: 27
End: 2017-06-21

## 2017-09-22 ENCOUNTER — OFFICE VISIT (OUTPATIENT)
Dept: GASTROENTEROLOGY | Facility: CLINIC | Age: 27
End: 2017-09-22

## 2017-09-22 VITALS
SYSTOLIC BLOOD PRESSURE: 120 MMHG | WEIGHT: 177.6 LBS | TEMPERATURE: 98.2 F | DIASTOLIC BLOOD PRESSURE: 82 MMHG | BODY MASS INDEX: 28.54 KG/M2 | HEIGHT: 66 IN

## 2017-09-22 DIAGNOSIS — Z87.19 HISTORY OF RECTAL BLEEDING: ICD-10-CM

## 2017-09-22 DIAGNOSIS — R10.84 GENERALIZED ABDOMINAL PAIN: ICD-10-CM

## 2017-09-22 DIAGNOSIS — D50.0 IRON DEFICIENCY ANEMIA DUE TO CHRONIC BLOOD LOSS: Primary | ICD-10-CM

## 2017-09-22 DIAGNOSIS — R19.8 ALTERNATING CONSTIPATION AND DIARRHEA: ICD-10-CM

## 2017-09-22 PROCEDURE — 99214 OFFICE O/P EST MOD 30 MIN: CPT | Performed by: INTERNAL MEDICINE

## 2017-09-22 RX ORDER — HYOSCYAMINE SULFATE 0.125 MG
0.12 TABLET ORAL EVERY 6 HOURS PRN
Qty: 90 TABLET | Refills: 1 | Status: SHIPPED | OUTPATIENT
Start: 2017-09-22 | End: 2018-10-22 | Stop reason: SDUPTHER

## 2017-09-22 NOTE — PROGRESS NOTES
Chief Complaint   Patient presents with   • Follow-up     Subjective     HPI  Hema Sweet is a 26 y.o. male who presents for follow up of iron deficiency anemia.  He had a normal EGD and colonoscopy but significant bleeding hemorrhoids were determined to be the source so he had hemorrhoidectomy with Dr. Whitten. He is now only rarely seeing blood with his stools-- only occurring with severe constipation and a small amount at a time.    He persists with varying GI symptoms consisting of alternating diarrhea and constipation. He finds that he is getting lots of stomach aches with eating, frequently leading to him having bowel movements.  Mostly stomach pain, occasional nausea. His stomach will be upset until he has a bowel movement.  He does still go between diarrhea and constipation and says that he never knows which one he will have.  He remains taking daily docusate, he will take miralax if he has not had a BM in a few days.  He finds that his symptoms are making it hard to go out to eat as he usually will have to have right home in case he needs to have a bowel movement.  He is actually gained weight over the past 3 months.    He is taking dicyclomine once to twice a day with varying relief of his symptoms.  He remains on pantoprazole.    He reports good energy levels.  He is able to do everything he needs to an wants to.  He remains on daily iron supplementation.  He has not had repeat iron labs or a CBC since May of this year.      Past Medical History:   Diagnosis Date   • Allergic rhinitis    • Anemia     IRON DEF, D/T BLOOD LOSS   • Bell's palsy    • Chronic diarrhea    • External hemorrhoids    • History of transfusion of packed red blood cells 2017   • Internal hemorrhoids    • Migraine    • Mononucleosis        Social History     Social History   • Marital status: Single     Spouse name: N/A   • Number of children: N/A   • Years of education: N/A     Social History Main Topics   • Smoking status: Never  Smoker   • Smokeless tobacco: Never Used   • Alcohol use Yes      Comment: rare   • Drug use: No   • Sexual activity: Defer     Other Topics Concern   • None     Social History Narrative         Current Outpatient Prescriptions:   •  docusate sodium (COLACE) 100 MG capsule, Take 1 capsule by mouth Daily. Can increase to 2 pills daily, Disp: 60 capsule, Rfl: 2  •  ferrous sulfate 325 (65 FE) MG tablet, Take 1 tablet by mouth 2 (Two) Times a Day for 180 days. (Patient taking differently: Take 325 mg by mouth Daily.), Disp: 60 tablet, Rfl: 5  •  lidocaine (XYLOCAINE) 5 % ointment, Apply  topically 3 (Three) Times a Day., Disp: 50 g, Rfl: 0  •  pantoprazole (PROTONIX) 40 MG EC tablet, Take 1 tablet by mouth Daily., Disp: 60 tablet, Rfl: 1  •  polyethylene glycol (MIRALAX) packet, Take 17 g by mouth 2 (Two) Times a Day. (Patient taking differently: Take 17 g by mouth As Needed.), Disp: 225 g, Rfl: 0  •  hyoscyamine (ANASPAZ,LEVSIN) 0.125 MG tablet, Take 1 tablet by mouth Every 6 (Six) Hours As Needed for Cramping or Diarrhea., Disp: 90 tablet, Rfl: 1    Review of Systems   Constitutional: Negative for activity change, appetite change and fatigue.   HENT: Negative for congestion, sore throat and trouble swallowing.    Respiratory: Negative.    Cardiovascular: Negative.    Gastrointestinal: Positive for anal bleeding, constipation, diarrhea and nausea. Negative for abdominal distention, abdominal pain and blood in stool.   Endocrine: Negative for cold intolerance and heat intolerance.   Genitourinary: Negative for difficulty urinating, dysuria and frequency.   Musculoskeletal: Negative for arthralgias, back pain and myalgias.   Skin: Negative.    Hematological: Negative for adenopathy. Does not bruise/bleed easily.   All other systems reviewed and are negative.      Objective   Vitals:    09/22/17 1517   BP: 120/82   Temp: 98.2 °F (36.8 °C)     Last Weight    09/22/17  1517   Weight: 177 lb 9.6 oz (80.6 kg)     Body mass  index is 28.67 kg/(m^2).      Physical Exam   Constitutional: He is oriented to person, place, and time. He appears well-developed and well-nourished. No distress.   HENT:   Head: Normocephalic and atraumatic.   Right Ear: External ear normal.   Left Ear: External ear normal.   Nose: Nose normal.   Eyes: Conjunctivae and EOM are normal. No scleral icterus.   Cardiovascular: Normal rate, regular rhythm, normal heart sounds and intact distal pulses.  Exam reveals no gallop.    No murmur heard.  No lower extremity edema   Pulmonary/Chest: Effort normal and breath sounds normal. No respiratory distress. He has no wheezes.   Abdominal: Soft. Normal appearance and bowel sounds are normal. He exhibits no distension and no mass. There is no hepatosplenomegaly. There is no rigidity, no rebound and no guarding. No hernia.   Genitourinary:   Genitourinary Comments: Rectal exam deferred   Musculoskeletal: Normal range of motion. He exhibits no edema or tenderness.   Normal digits and nails of both hands.  No atrophy or wasting of upper or lower extremeties   Neurological: He is alert and oriented to person, place, and time. He displays no atrophy. Coordination normal.   Skin: Skin is warm and dry. No rash noted. He is not diaphoretic. No erythema.   Psychiatric: He has a normal mood and affect. His behavior is normal. Judgment and thought content normal.   Vitals reviewed.      WBC   Date Value Ref Range Status   05/19/2017 7.06 4.50 - 10.70 10*3/mm3 Final   04/06/2017 8.21 4.50 - 10.70 10*3/mm3 Final     RBC   Date Value Ref Range Status   05/19/2017 5.56 4.60 - 6.00 10*6/mm3 Final   04/06/2017 5.02 4.60 - 6.00 10*6/mm3 Final     Hemoglobin   Date Value Ref Range Status   05/19/2017 13.5 (L) 13.7 - 17.6 g/dL Final   04/06/2017 10.9 (L) 13.7 - 17.6 g/dL Final     Hematocrit   Date Value Ref Range Status   05/19/2017 43.0 40.4 - 52.2 % Final   04/06/2017 35.1 (L) 40.4 - 52.2 % Final     MCV   Date Value Ref Range Status    05/19/2017 77.3 (L) 79.8 - 96.2 fL Final   04/06/2017 69.9 (L) 79.8 - 96.2 fL Final     MCH   Date Value Ref Range Status   05/19/2017 24.3 (L) 27.0 - 32.7 pg Final   04/06/2017 21.7 (L) 27.0 - 32.7 pg Final     MCHC   Date Value Ref Range Status   05/19/2017 31.4 (L) 32.6 - 36.4 g/dL Final   04/06/2017 31.1 (L) 32.6 - 36.4 g/dL Final     RDW   Date Value Ref Range Status   05/19/2017 24.4 (H) 11.5 - 14.5 % Final   04/06/2017  11.5 - 14.5 % Final     Comment:     NOT ABLE TO CALCULATE     RDW-SD   Date Value Ref Range Status   03/28/2017 67.0 (H) 37.0 - 54.0 fl Final     MPV   Date Value Ref Range Status   04/06/2017 9.8 6.0 - 12.0 fL Final     Platelets   Date Value Ref Range Status   05/19/2017 272 140 - 500 10*3/mm3 Final   04/06/2017 379 140 - 500 10*3/mm3 Final     Neutrophil %   Date Value Ref Range Status   04/06/2017 58.2 42.7 - 76.0 % Final     Neutrophil Rel %   Date Value Ref Range Status   05/19/2017 57.2 42.7 - 76.0 % Final     Lymphocyte %   Date Value Ref Range Status   04/06/2017 30.7 19.6 - 45.3 % Final     Lymphocyte Rel %   Date Value Ref Range Status   05/19/2017 31.7 19.6 - 45.3 % Final     Monocyte %   Date Value Ref Range Status   04/06/2017 9.6 5.0 - 12.0 % Final     Monocyte Rel %   Date Value Ref Range Status   05/19/2017 7.1 5.0 - 12.0 % Final     Eosinophil %   Date Value Ref Range Status   04/06/2017 0.7 0.3 - 6.2 % Final     Eosinophil Rel %   Date Value Ref Range Status   05/19/2017 3.0 0.3 - 6.2 % Final     Basophil %   Date Value Ref Range Status   04/06/2017 0.6 0.0 - 1.5 % Final     Basophil Rel %   Date Value Ref Range Status   05/19/2017 0.6 0.0 - 1.5 % Final     Immature Grans %   Date Value Ref Range Status   04/06/2017 0.2 0.0 - 0.5 % Final     Neutrophils, Absolute   Date Value Ref Range Status   04/06/2017 4.77 1.90 - 8.10 10*3/mm3 Final     Neutrophils Absolute   Date Value Ref Range Status   05/19/2017 4.04 1.90 - 8.10 10*3/mm3 Final     Lymphocytes, Absolute   Date  Value Ref Range Status   04/06/2017 2.52 0.90 - 4.80 10*3/mm3 Final     Lymphocytes Absolute   Date Value Ref Range Status   05/19/2017 2.24 0.90 - 4.80 10*3/mm3 Final     Monocytes, Absolute   Date Value Ref Range Status   04/06/2017 0.79 0.20 - 1.20 10*3/mm3 Final     Monocytes Absolute   Date Value Ref Range Status   05/19/2017 0.50 0.20 - 1.20 10*3/mm3 Final     Eosinophils, Absolute   Date Value Ref Range Status   04/06/2017 0.06 0.00 - 0.70 10*3/mm3 Final     Eosinophils Absolute   Date Value Ref Range Status   05/19/2017 0.21 0.00 - 0.70 10*3/mm3 Final     Basophils, Absolute   Date Value Ref Range Status   04/06/2017 0.05 0.00 - 0.20 10*3/mm3 Final     Basophils Absolute   Date Value Ref Range Status   05/19/2017 0.04 0.00 - 0.20 10*3/mm3 Final     Immature Grans, Absolute   Date Value Ref Range Status   04/06/2017 0.02 0.00 - 0.03 10*3/mm3 Final     nRBC   Date Value Ref Range Status   05/19/2017 0.0 0.0 - 0.0 /100 WBC Final   04/06/2017 0.0 0.0 - 0.0 /100 WBC Final       Lab Results   Component Value Date    GLUCOSE 90 04/06/2017    BUN 11 04/06/2017    CREATININE 0.95 04/06/2017    EGFRIFNONA 96 04/06/2017    EGFRIFAFRI 125 03/20/2017    BCR 11.6 04/06/2017    CO2 24.3 04/06/2017    CALCIUM 9.9 04/06/2017    PROTENTOTREF 6.8 03/20/2017    ALBUMIN 4.70 04/06/2017    LABIL2 1.7 04/06/2017    AST 16 04/06/2017    ALT 32 04/06/2017         Imaging Results (last 7 days)     ** No results found for the last 168 hours. **            Assessment/Plan    1. Iron deficiency anemia: Likely related to his bleeding hemorrhoids.  He has been on oral iron for a number of months.  Due for repeat labs  2.  Alternating diarrhea and constipation: He is describing irritable bowel symptoms that have become quite problematic to him  3. Internal and external hemorrhoids: s/p hemorrhoidectomy, symptoms are much improved    Plan:  -CBC and iron studies today to assess his iron deficiency anemia  -I'm having him start daily align  probiotic; I have given him samples  -Will have him stop the dicyclomine and start Levsin instead to see if this helps more with his irritable bowel type symptoms    Hema was seen today for follow-up.    Diagnoses and all orders for this visit:    Iron deficiency anemia due to chronic blood loss  -     CBC & Differential  -     Iron Profile  -     Ferritin    Alternating constipation and diarrhea  -     hyoscyamine (ANASPAZ,LEVSIN) 0.125 MG tablet; Take 1 tablet by mouth Every 6 (Six) Hours As Needed for Cramping or Diarrhea.    History of rectal bleeding    Generalized abdominal pain  -     hyoscyamine (ANASPAZ,LEVSIN) 0.125 MG tablet; Take 1 tablet by mouth Every 6 (Six) Hours As Needed for Cramping or Diarrhea.        Dictated utilizing Dragon dictation

## 2017-09-23 LAB
BASOPHILS # BLD AUTO: 0.05 10*3/MM3 (ref 0–0.2)
BASOPHILS NFR BLD AUTO: 0.5 % (ref 0–1.5)
EOSINOPHIL # BLD AUTO: 0.11 10*3/MM3 (ref 0–0.7)
EOSINOPHIL NFR BLD AUTO: 1.2 % (ref 0.3–6.2)
ERYTHROCYTE [DISTWIDTH] IN BLOOD BY AUTOMATED COUNT: 14.3 % (ref 11.5–14.5)
FERRITIN SERPL-MCNC: 53.34 NG/ML (ref 30–400)
HCT VFR BLD AUTO: 43.9 % (ref 40.4–52.2)
HGB BLD-MCNC: 14.9 G/DL (ref 13.7–17.6)
IMM GRANULOCYTES # BLD: 0.06 10*3/MM3 (ref 0–0.03)
IMM GRANULOCYTES NFR BLD: 0.6 % (ref 0–0.5)
IRON SATN MFR SERPL: 14 % (ref 20–50)
IRON SERPL-MCNC: 57 MCG/DL (ref 59–158)
LYMPHOCYTES # BLD AUTO: 2.11 10*3/MM3 (ref 0.9–4.8)
LYMPHOCYTES NFR BLD AUTO: 22.6 % (ref 19.6–45.3)
MCH RBC QN AUTO: 28.4 PG (ref 27–32.7)
MCHC RBC AUTO-ENTMCNC: 33.9 G/DL (ref 32.6–36.4)
MCV RBC AUTO: 83.6 FL (ref 79.8–96.2)
MONOCYTES # BLD AUTO: 0.88 10*3/MM3 (ref 0.2–1.2)
MONOCYTES NFR BLD AUTO: 9.4 % (ref 5–12)
NEUTROPHILS # BLD AUTO: 6.12 10*3/MM3 (ref 1.9–8.1)
NEUTROPHILS NFR BLD AUTO: 65.7 % (ref 42.7–76)
PLATELET # BLD AUTO: 274 10*3/MM3 (ref 140–500)
RBC # BLD AUTO: 5.25 10*6/MM3 (ref 4.6–6)
TIBC SERPL-MCNC: 419 MCG/DL
UIBC SERPL-MCNC: 362 MCG/DL
WBC # BLD AUTO: 9.33 10*3/MM3 (ref 4.5–10.7)

## 2017-09-25 ENCOUNTER — TELEPHONE (OUTPATIENT)
Dept: GASTROENTEROLOGY | Facility: CLINIC | Age: 27
End: 2017-09-25

## 2017-09-25 NOTE — TELEPHONE ENCOUNTER
----- Message from Keiry Martin MD sent at 9/25/2017 12:08 PM EDT -----  His hemoglobin, iron, and iron stores are all improved.  I would have him continue the iron supplementation for the next month and then he can stop.  We will recheck his iron stores ~3 months after stopping the iron.

## 2017-09-25 NOTE — PROGRESS NOTES
His hemoglobin, iron, and iron stores are all improved.  I would have him continue the iron supplementation for the next month and then he can stop.  We will recheck his iron stores ~3 months after stopping the iron.

## 2017-09-25 NOTE — TELEPHONE ENCOUNTER
Call to pt.  Advise per Dr Martin that Hgb, iron and iron stores are all improved.  Dr Martin would have pt continue the iron supplementation for the next month, and then can stop. Will recheck iron stores about 3 mo's after stopping the iron.    Instruct pt to call to make lab appt when stops iron supplement.  Pt verb understanding.

## 2017-10-19 ENCOUNTER — RESULTS ENCOUNTER (OUTPATIENT)
Dept: FAMILY MEDICINE CLINIC | Facility: CLINIC | Age: 27
End: 2017-10-19

## 2017-10-19 DIAGNOSIS — D50.8 OTHER IRON DEFICIENCY ANEMIA: ICD-10-CM

## 2017-11-13 ENCOUNTER — OFFICE VISIT (OUTPATIENT)
Dept: FAMILY MEDICINE CLINIC | Facility: CLINIC | Age: 27
End: 2017-11-13

## 2017-11-13 VITALS
HEART RATE: 75 BPM | BODY MASS INDEX: 27.8 KG/M2 | HEIGHT: 66 IN | DIASTOLIC BLOOD PRESSURE: 89 MMHG | TEMPERATURE: 98 F | WEIGHT: 173 LBS | RESPIRATION RATE: 16 BRPM | SYSTOLIC BLOOD PRESSURE: 130 MMHG

## 2017-11-13 DIAGNOSIS — D50.0 IRON DEFICIENCY ANEMIA DUE TO CHRONIC BLOOD LOSS: Primary | ICD-10-CM

## 2017-11-13 PROBLEM — R10.11 RIGHT UPPER QUADRANT ABDOMINAL PAIN: Status: RESOLVED | Noted: 2017-03-16 | Resolved: 2017-11-13

## 2017-11-13 PROBLEM — Z87.19 HISTORY OF RECTAL BLEEDING: Status: RESOLVED | Noted: 2017-03-16 | Resolved: 2017-11-13

## 2017-11-13 PROCEDURE — 99212 OFFICE O/P EST SF 10 MIN: CPT | Performed by: FAMILY MEDICINE

## 2017-11-13 NOTE — PROGRESS NOTES
"Chief Complaint   Patient presents with   • Anemia       Subjective   This patient returns the office to reevaluate iron deficiency anemia.  Labs are due and he will get those later this morning.  He continues to have some irritable bowel issues and will follow-up with his gastroenterologist regarding that issue.  Current medicines are not fully controlling that issue.  His rectal bleeding has mostly subsided.  He still has intermittent issues with his hemorrhoid.  Back in May the gastroenterologist told him it was not ulcerative colitis.  I have reviewed and updated his medications, medical history and problem list during today's office visit.        Social History   Substance Use Topics   • Smoking status: Never Smoker   • Smokeless tobacco: Never Used   • Alcohol use Yes      Comment: rare       Review of Systems   Constitutional: Negative for fatigue.   Gastrointestinal:        See HPI       Objective   /89  Pulse 75  Temp 98 °F (36.7 °C) (Oral)   Resp 16  Ht 66\" (167.6 cm)  Wt 173 lb (78.5 kg)  BMI 27.92 kg/m2  Body mass index is 27.92 kg/(m^2).  Physical Exam   Constitutional: He is cooperative. No distress.   Eyes: Conjunctivae and lids are normal.   Neck: Carotid bruit is not present. No tracheal deviation present.   Cardiovascular: Normal rate, regular rhythm and normal heart sounds.    No murmur heard.  Pulmonary/Chest: Effort normal and breath sounds normal.   Neurological: He is alert. He is not disoriented.   Skin: Skin is warm and dry.   Psychiatric: He has a normal mood and affect. His speech is normal and behavior is normal.   Vitals reviewed.      Data Reviewed:        Assessment/Plan     Problem List Items Addressed This Visit        Hematopoietic and Hemostatic    MANUEL (iron deficiency anemia) - Primary          Outpatient Encounter Prescriptions as of 11/13/2017   Medication Sig Dispense Refill   • docusate sodium (COLACE) 100 MG capsule Take 1 capsule by mouth Daily. Can increase to 2 " pills daily 60 capsule 2   • ferrous sulfate 325 (65 FE) MG tablet Take 1 tablet by mouth 2 (Two) Times a Day for 180 days. (Patient taking differently: Take 325 mg by mouth Daily.) 60 tablet 5   • hyoscyamine (ANASPAZ,LEVSIN) 0.125 MG tablet Take 1 tablet by mouth Every 6 (Six) Hours As Needed for Cramping or Diarrhea. 90 tablet 1   • lidocaine (XYLOCAINE) 5 % ointment Apply  topically 3 (Three) Times a Day. 50 g 0   • pantoprazole (PROTONIX) 40 MG EC tablet Take 1 tablet by mouth Daily. 60 tablet 1   • polyethylene glycol (MIRALAX) packet Take 17 g by mouth 2 (Two) Times a Day. (Patient taking differently: Take 17 g by mouth As Needed.) 225 g 0     No facility-administered encounter medications on file as of 11/13/2017.        No orders of the defined types were placed in this encounter.             RTC as needed or sooner if symptoms worsen or change

## 2017-11-14 LAB
BASOPHILS # BLD AUTO: 0.03 10*3/MM3 (ref 0–0.2)
BASOPHILS NFR BLD AUTO: 0.5 % (ref 0–1.5)
EOSINOPHIL # BLD AUTO: 0.07 10*3/MM3 (ref 0–0.7)
EOSINOPHIL NFR BLD AUTO: 1.1 % (ref 0.3–6.2)
ERYTHROCYTE [DISTWIDTH] IN BLOOD BY AUTOMATED COUNT: 13.8 % (ref 11.5–14.5)
FERRITIN SERPL-MCNC: 79.54 NG/ML (ref 30–400)
HCT VFR BLD AUTO: 46.7 % (ref 40.4–52.2)
HGB BLD-MCNC: 15.3 G/DL (ref 13.7–17.6)
IMM GRANULOCYTES # BLD: 0 10*3/MM3 (ref 0–0.03)
IMM GRANULOCYTES NFR BLD: 0 % (ref 0–0.5)
IRON SATN MFR SERPL: 26 % (ref 20–50)
IRON SERPL-MCNC: 108 MCG/DL (ref 59–158)
LYMPHOCYTES # BLD AUTO: 1.96 10*3/MM3 (ref 0.9–4.8)
LYMPHOCYTES NFR BLD AUTO: 31.2 % (ref 19.6–45.3)
MCH RBC QN AUTO: 28.7 PG (ref 27–32.7)
MCHC RBC AUTO-ENTMCNC: 32.8 G/DL (ref 32.6–36.4)
MCV RBC AUTO: 87.5 FL (ref 79.8–96.2)
MONOCYTES # BLD AUTO: 0.52 10*3/MM3 (ref 0.2–1.2)
MONOCYTES NFR BLD AUTO: 8.3 % (ref 5–12)
NEUTROPHILS # BLD AUTO: 3.7 10*3/MM3 (ref 1.9–8.1)
NEUTROPHILS NFR BLD AUTO: 58.9 % (ref 42.7–76)
PLATELET # BLD AUTO: 278 10*3/MM3 (ref 140–500)
RBC # BLD AUTO: 5.34 10*6/MM3 (ref 4.6–6)
TIBC SERPL-MCNC: 419 MCG/DL
UIBC SERPL-MCNC: 311 MCG/DL
WBC # BLD AUTO: 6.28 10*3/MM3 (ref 4.5–10.7)

## 2017-11-17 ENCOUNTER — OFFICE VISIT (OUTPATIENT)
Dept: GASTROENTEROLOGY | Facility: CLINIC | Age: 27
End: 2017-11-17

## 2017-11-17 VITALS
HEIGHT: 66 IN | TEMPERATURE: 98.4 F | BODY MASS INDEX: 27.8 KG/M2 | WEIGHT: 173 LBS | SYSTOLIC BLOOD PRESSURE: 110 MMHG | DIASTOLIC BLOOD PRESSURE: 70 MMHG

## 2017-11-17 DIAGNOSIS — D50.0 IRON DEFICIENCY ANEMIA DUE TO CHRONIC BLOOD LOSS: Primary | ICD-10-CM

## 2017-11-17 DIAGNOSIS — K64.1 GRADE II HEMORRHOIDS: ICD-10-CM

## 2017-11-17 DIAGNOSIS — K59.1 FUNCTIONAL DIARRHEA: ICD-10-CM

## 2017-11-17 PROCEDURE — 99214 OFFICE O/P EST MOD 30 MIN: CPT | Performed by: INTERNAL MEDICINE

## 2017-11-17 NOTE — PATIENT INSTRUCTIONS
Stop the docusate (stool softener).  See how you do without it for a couple of days.  If still having diarrhea, start imodium every morning.  Can take this every 6 hours to control your stools.    Start metamucil or citrucel (tabs) twice fiber for stool bulking    For any additional questions, concerns or changes to your condition after today's office visit please contact the office at [phone].

## 2018-02-16 ENCOUNTER — TELEPHONE (OUTPATIENT)
Dept: FAMILY MEDICINE CLINIC | Facility: CLINIC | Age: 28
End: 2018-02-16

## 2018-02-16 RX ORDER — OSELTAMIVIR PHOSPHATE 75 MG/1
75 CAPSULE ORAL DAILY
Qty: 7 CAPSULE | Refills: 0 | Status: SHIPPED | OUTPATIENT
Start: 2018-02-16 | End: 2018-04-09

## 2018-04-09 ENCOUNTER — OFFICE VISIT (OUTPATIENT)
Dept: FAMILY MEDICINE CLINIC | Facility: CLINIC | Age: 28
End: 2018-04-09

## 2018-04-09 VITALS
BODY MASS INDEX: 29.25 KG/M2 | HEIGHT: 66 IN | DIASTOLIC BLOOD PRESSURE: 81 MMHG | SYSTOLIC BLOOD PRESSURE: 122 MMHG | RESPIRATION RATE: 16 BRPM | HEART RATE: 63 BPM | WEIGHT: 182 LBS | TEMPERATURE: 99.3 F

## 2018-04-09 DIAGNOSIS — Z13.6 SCREENING FOR ISCHEMIC HEART DISEASE: ICD-10-CM

## 2018-04-09 DIAGNOSIS — D50.0 IRON DEFICIENCY ANEMIA DUE TO CHRONIC BLOOD LOSS: ICD-10-CM

## 2018-04-09 DIAGNOSIS — R53.83 FATIGUE, UNSPECIFIED TYPE: Primary | ICD-10-CM

## 2018-04-09 PROBLEM — K62.5 RECTAL BLEEDING: Status: RESOLVED | Noted: 2017-03-22 | Resolved: 2018-04-09

## 2018-04-09 PROCEDURE — 99213 OFFICE O/P EST LOW 20 MIN: CPT | Performed by: FAMILY MEDICINE

## 2018-04-09 NOTE — PROGRESS NOTES
"Chief Complaint   Patient presents with   • Fatigue       Subjective   Pt has had a recurrence of fatigue. He has not had any bleeding or dark colored stools. Maternal grandfather  the end of February. Sx of fatigue preceeded GF passing away. Sx of fatigue for 6-8 weeks. No SOA. He has noticed decreased exercise tolerance.   I have reviewed and updated his medications, medical history and problem list during today's office visit.       Social History   Substance Use Topics   • Smoking status: Never Smoker   • Smokeless tobacco: Never Used   • Alcohol use Yes      Comment: rare     Review of Systems   Constitutional: Negative for fatigue.        Feels cold   Respiratory: Negative for shortness of breath (but exercise intolerence).      Objective   /81   Pulse 63   Temp 99.3 °F (37.4 °C) (Oral)   Resp 16   Ht 167.6 cm (66\")   Wt 82.6 kg (182 lb)   BMI 29.38 kg/m²   Body mass index is 29.38 kg/m².  Physical Exam   Constitutional: He is oriented to person, place, and time. He appears well-developed. No distress.   Eyes: Conjunctivae and lids are normal.   Neck: Carotid bruit is not present.   Cardiovascular: Normal rate, regular rhythm and normal heart sounds.    Pulmonary/Chest: Effort normal and breath sounds normal.   Neurological: He is alert and oriented to person, place, and time.   Skin: Skin is warm and dry.   Psychiatric: He has a normal mood and affect. His behavior is normal.   Vitals reviewed.       Data Reviewed:             Assessment/Plan   Problem List Items Addressed This Visit        Hematopoietic and Hemostatic    MANUEL (iron deficiency anemia)    Relevant Orders    CBC and Differential    Ferritin    Iron Profile    Vitamin B12 & Folate      Other Visit Diagnoses     Fatigue, unspecified type    -  Primary    Relevant Orders    Comprehensive metabolic panel    CBC and Differential    TSH    Vitamin D 25 Hydroxy    Screening for ischemic heart disease        Relevant Orders    Lipid " Panel With LDL/HDL Ratio        Orders Placed This Encounter   Procedures   • Comprehensive metabolic panel   • Lipid Panel With LDL/HDL Ratio   • TSH   • Ferritin   • Iron Profile   • Vitamin B12 & Folate   • Vitamin D 25 Hydroxy   • CBC and Differential     Order Specific Question:   Manual Differential     Answer:   No     F/U in one month

## 2018-04-10 LAB
25(OH)D3+25(OH)D2 SERPL-MCNC: 14.7 NG/ML (ref 30–100)
ALBUMIN SERPL-MCNC: 4.8 G/DL (ref 3.5–5.2)
ALBUMIN/GLOB SERPL: 1.8 G/DL
ALP SERPL-CCNC: 128 U/L (ref 39–117)
ALT SERPL-CCNC: 61 U/L (ref 1–41)
AST SERPL-CCNC: 30 U/L (ref 1–40)
BASOPHILS # BLD AUTO: 0.07 10*3/MM3 (ref 0–0.2)
BASOPHILS NFR BLD AUTO: 0.8 % (ref 0–1.5)
BILIRUB SERPL-MCNC: 0.3 MG/DL (ref 0.1–1.2)
BUN SERPL-MCNC: 13 MG/DL (ref 6–20)
BUN/CREAT SERPL: 14 (ref 7–25)
CALCIUM SERPL-MCNC: 9.2 MG/DL (ref 8.6–10.5)
CHLORIDE SERPL-SCNC: 101 MMOL/L (ref 98–107)
CHOLEST SERPL-MCNC: 195 MG/DL (ref 0–200)
CO2 SERPL-SCNC: 25.3 MMOL/L (ref 22–29)
CREAT SERPL-MCNC: 0.93 MG/DL (ref 0.76–1.27)
EOSINOPHIL # BLD AUTO: 0.29 10*3/MM3 (ref 0–0.7)
EOSINOPHIL NFR BLD AUTO: 3.4 % (ref 0.3–6.2)
ERYTHROCYTE [DISTWIDTH] IN BLOOD BY AUTOMATED COUNT: 13.5 % (ref 11.5–14.5)
FERRITIN SERPL-MCNC: 85.09 NG/ML (ref 30–400)
FOLATE SERPL-MCNC: 9.21 NG/ML (ref 4.78–24.2)
GFR SERPLBLD CREATININE-BSD FMLA CKD-EPI: 118 ML/MIN/1.73
GFR SERPLBLD CREATININE-BSD FMLA CKD-EPI: 97 ML/MIN/1.73
GLOBULIN SER CALC-MCNC: 2.7 GM/DL
GLUCOSE SERPL-MCNC: 86 MG/DL (ref 65–99)
HCT VFR BLD AUTO: 43.9 % (ref 40.4–52.2)
HDLC SERPL-MCNC: 43 MG/DL (ref 40–60)
HGB BLD-MCNC: 14.6 G/DL (ref 13.7–17.6)
IMM GRANULOCYTES # BLD: 0.03 10*3/MM3 (ref 0–0.03)
IMM GRANULOCYTES NFR BLD: 0.4 % (ref 0–0.5)
IRON SATN MFR SERPL: 15 % (ref 20–50)
IRON SERPL-MCNC: 60 MCG/DL (ref 59–158)
LDLC SERPL CALC-MCNC: 122 MG/DL (ref 0–100)
LDLC/HDLC SERPL: 2.84 {RATIO}
LYMPHOCYTES # BLD AUTO: 2.65 10*3/MM3 (ref 0.9–4.8)
LYMPHOCYTES NFR BLD AUTO: 31.1 % (ref 19.6–45.3)
MCH RBC QN AUTO: 29 PG (ref 27–32.7)
MCHC RBC AUTO-ENTMCNC: 33.3 G/DL (ref 32.6–36.4)
MCV RBC AUTO: 87.3 FL (ref 79.8–96.2)
MONOCYTES # BLD AUTO: 0.53 10*3/MM3 (ref 0.2–1.2)
MONOCYTES NFR BLD AUTO: 6.2 % (ref 5–12)
NEUTROPHILS # BLD AUTO: 4.94 10*3/MM3 (ref 1.9–8.1)
NEUTROPHILS NFR BLD AUTO: 58.1 % (ref 42.7–76)
PLATELET # BLD AUTO: 285 10*3/MM3 (ref 140–500)
POTASSIUM SERPL-SCNC: 4.4 MMOL/L (ref 3.5–5.2)
PROT SERPL-MCNC: 7.5 G/DL (ref 6–8.5)
RBC # BLD AUTO: 5.03 10*6/MM3 (ref 4.6–6)
SODIUM SERPL-SCNC: 141 MMOL/L (ref 136–145)
TIBC SERPL-MCNC: 407 MCG/DL
TRIGL SERPL-MCNC: 150 MG/DL (ref 0–150)
TSH SERPL DL<=0.005 MIU/L-ACNC: 2.59 MIU/ML (ref 0.27–4.2)
UIBC SERPL-MCNC: 347 MCG/DL
VIT B12 SERPL-MCNC: 526 PG/ML (ref 211–946)
VLDLC SERPL CALC-MCNC: 30 MG/DL (ref 5–40)
WBC # BLD AUTO: 8.51 10*3/MM3 (ref 4.5–10.7)

## 2018-05-07 ENCOUNTER — OFFICE VISIT (OUTPATIENT)
Dept: FAMILY MEDICINE CLINIC | Facility: CLINIC | Age: 28
End: 2018-05-07

## 2018-05-07 VITALS
HEART RATE: 70 BPM | BODY MASS INDEX: 29.73 KG/M2 | DIASTOLIC BLOOD PRESSURE: 81 MMHG | RESPIRATION RATE: 16 BRPM | TEMPERATURE: 97.8 F | WEIGHT: 185 LBS | HEIGHT: 66 IN | SYSTOLIC BLOOD PRESSURE: 133 MMHG

## 2018-05-07 DIAGNOSIS — E55.9 VITAMIN D DEFICIENCY: ICD-10-CM

## 2018-05-07 DIAGNOSIS — R74.01 ELEVATED ALT MEASUREMENT: ICD-10-CM

## 2018-05-07 DIAGNOSIS — R53.83 OTHER FATIGUE: Primary | ICD-10-CM

## 2018-05-07 DIAGNOSIS — B00.1 FEVER BLISTER: ICD-10-CM

## 2018-05-07 PROBLEM — D50.9 IDA (IRON DEFICIENCY ANEMIA): Status: RESOLVED | Noted: 2017-03-23 | Resolved: 2018-05-07

## 2018-05-07 PROBLEM — K64.4 EXTERNAL HEMORRHOID: Status: RESOLVED | Noted: 2017-03-23 | Resolved: 2018-05-07

## 2018-05-07 PROBLEM — Z83.79 FAMILY HISTORY OF ULCERATIVE COLITIS: Status: RESOLVED | Noted: 2017-03-16 | Resolved: 2018-05-07

## 2018-05-07 PROCEDURE — 99214 OFFICE O/P EST MOD 30 MIN: CPT | Performed by: FAMILY MEDICINE

## 2018-05-07 RX ORDER — VALACYCLOVIR HYDROCHLORIDE 1 G/1
2000 TABLET, FILM COATED ORAL 2 TIMES DAILY
Qty: 4 TABLET | Refills: 5 | Status: SHIPPED | OUTPATIENT
Start: 2018-05-07 | End: 2021-01-13

## 2018-05-07 NOTE — PROGRESS NOTES
"Chief Complaint   Patient presents with   • Fatigue       Subjective   This patient presents the office with ongoing fatigue.  It has been present for the last 6 months.  His iron deficiency anemia has resolved.  He still has some low iron saturation but his overall hemoglobin has much improved.  Labs have been reviewed and do show marked decreased vitamin D level.  He will supplement over-the-counter and getting out and walking at least 20 minutes daily in the sunshine.  Labs also did show mild elevation of one ALT level.  Medications on his list have been reviewed and he only takes them periodically.  He is a nondrinker.  We will check some repeat labs in 6 weeks.  Currently he is asymptomatic other than the fatigue.  I have reviewed and updated his medications, medical history and problem list during today's office visit.     Patient Care Team:  Trae Palacio MD as PCP - General (Family Medicine)  Darlene Whitten MD as Consulting Physician (Colon and Rectal Surgery)  Viktor Post Jr., MD as Consulting Physician (Urology)  Keiry Martin MD as Consulting Physician (Gastroenterology)    Social History   Substance Use Topics   • Smoking status: Never Smoker   • Smokeless tobacco: Never Used   • Alcohol use Yes      Comment: rare       Review of Systems   Constitutional: Positive for fatigue. Negative for fever and unexpected weight loss.   Gastrointestinal: Negative for abdominal pain, blood in stool and nausea.   Skin: Negative for color change.       Objective     /81   Pulse 70   Temp 97.8 °F (36.6 °C) (Oral)   Resp 16   Ht 167.6 cm (66\")   Wt 83.9 kg (185 lb)   BMI 29.86 kg/m²     Body mass index is 29.86 kg/m².    Physical Exam   Constitutional: He is oriented to person, place, and time. He appears well-developed. No distress.   Eyes: Conjunctivae and lids are normal.   Neck: Carotid bruit is not present.   Cardiovascular: Normal rate, regular rhythm and normal heart sounds.  "   Pulmonary/Chest: Effort normal and breath sounds normal.   Abdominal: Soft. There is no tenderness.   Neurological: He is alert and oriented to person, place, and time.   Skin: Skin is warm and dry.   Psychiatric: He has a normal mood and affect. His behavior is normal.   Vitals reviewed.       Data Reviewed:         Lab Results   Component Value Date    GLU 86 04/09/2018    BUN 13 04/09/2018    CREATININE 0.93 04/09/2018    EGFRIFNONA 97 04/09/2018    EGFRIFAFRI 118 04/09/2018     04/09/2018    K 4.4 04/09/2018     04/09/2018    CALCIUM 9.2 04/09/2018    PROTENTOTREF 7.5 04/09/2018    ALBUMIN 4.80 04/09/2018    LABGLOBREF 2.7 04/09/2018    BILITOT 0.3 04/09/2018    ALKPHOS 128 (H) 04/09/2018    AST 30 04/09/2018    ALT 61 (H) 04/09/2018     CBC w/ diff:   Lab Results   Component Value Date    WBC 8.51 04/09/2018    RBC 5.03 04/09/2018    HGB 14.6 04/09/2018    HCT 43.9 04/09/2018    MCV 87.3 04/09/2018    MCH 29.0 04/09/2018    MCHC 33.3 04/09/2018    RDW 13.5 04/09/2018     04/09/2018    NEUTRORELPCT 58.1 04/09/2018    AUTOIGPER 0.2 04/06/2017    LYMPHORELPCT 31.1 04/09/2018    MONORELPCT 6.2 04/09/2018    EOSRELPCT 3.4 04/09/2018    BASORELPCT 0.8 04/09/2018     LIPID PANEL:  Lab Results   Component Value Date    CHLPL 195 04/09/2018    TRIG 150 04/09/2018    HDL 43 04/09/2018    VLDL 30 04/09/2018     (H) 04/09/2018    LDLHDL 2.84 04/09/2018     TSH:  Lab Results   Component Value Date    TSH 2.590 04/09/2018       Assessment/Plan     Problem List Items Addressed This Visit        Digestive    Vitamin D deficiency    Relevant Medications    Cholecalciferol (VITAMIN D) 1000 units tablet       Other    Elevated ALT measurement    Relevant Orders    Hepatitis C Antibody    Hepatitis A Antibody, Total    Hepatitis B Surface Antibody    Hepatitis B Surface Antigen    Vitamin D 25 Hydroxy    Hepatic Function Panel    Fever blister    Relevant Medications    valACYclovir (VALTREX) 1000 MG  tablet    Other fatigue - Primary      Other Visit Diagnoses    None.         Orders Placed This Encounter   Procedures   • Hepatitis C Antibody     Standing Status:   Future     Standing Expiration Date:   11/3/2018   • Hepatitis A Antibody, Total     Standing Status:   Future     Standing Expiration Date:   11/3/2018   • Hepatitis B Surface Antibody     Standing Status:   Future     Standing Expiration Date:   2/1/2019   • Hepatitis B Surface Antigen     Standing Status:   Future     Standing Expiration Date:   5/7/2019   • Vitamin D 25 Hydroxy     Standing Status:   Future   • Hepatic Function Panel     Standing Status:   Future         Current Outpatient Prescriptions:   •  docusate sodium (COLACE) 100 MG capsule, Take 1 capsule by mouth Daily. Can increase to 2 pills daily, Disp: 60 capsule, Rfl: 2  •  hyoscyamine (ANASPAZ,LEVSIN) 0.125 MG tablet, Take 1 tablet by mouth Every 6 (Six) Hours As Needed for Cramping or Diarrhea., Disp: 90 tablet, Rfl: 1  •  lidocaine (XYLOCAINE) 5 % ointment, Apply  topically 3 (Three) Times a Day., Disp: 50 g, Rfl: 0  •  pantoprazole (PROTONIX) 40 MG EC tablet, Take 1 tablet by mouth Daily., Disp: 60 tablet, Rfl: 1  •  polyethylene glycol (MIRALAX) packet, Take 17 g by mouth 2 (Two) Times a Day. (Patient taking differently: Take 17 g by mouth As Needed.), Disp: 225 g, Rfl: 0  •  Cholecalciferol (VITAMIN D) 1000 units tablet, Take 1 tablet by mouth Daily., Disp: 30 tablet, Rfl: 11  •  valACYclovir (VALTREX) 1000 MG tablet, Take 2 tablets by mouth 2 (Two) Times a Day for 1 day., Disp: 4 tablet, Rfl: 5    Return if symptoms worsen or fail to improve.

## 2018-06-01 ENCOUNTER — RESULTS ENCOUNTER (OUTPATIENT)
Dept: FAMILY MEDICINE CLINIC | Facility: CLINIC | Age: 28
End: 2018-06-01

## 2018-06-01 DIAGNOSIS — R74.01 ELEVATED ALT MEASUREMENT: ICD-10-CM

## 2018-06-15 LAB
25(OH)D3+25(OH)D2 SERPL-MCNC: 37.3 NG/ML (ref 30–100)
ALBUMIN SERPL-MCNC: 4.8 G/DL (ref 3.5–5.2)
ALP SERPL-CCNC: 120 U/L (ref 39–117)
ALT SERPL-CCNC: 45 U/L (ref 1–41)
AST SERPL-CCNC: 29 U/L (ref 1–40)
BILIRUB DIRECT SERPL-MCNC: <0.2 MG/DL (ref 0–0.3)
BILIRUB SERPL-MCNC: 0.5 MG/DL (ref 0.1–1.2)
HAV AB SER QL IA: NEGATIVE
HBV SURFACE AB SER QL: REACTIVE
HBV SURFACE AG SERPL QL IA: NEGATIVE
HCV AB S/CO SERPL IA: 0.2 S/CO RATIO (ref 0–0.9)
PROT SERPL-MCNC: 7.5 G/DL (ref 6–8.5)

## 2018-06-18 NOTE — PROGRESS NOTES
Hema some of your labs are still abnormal. Your liver function tests are improved but still mildly abnormal.  I have set up repeat tests in 2 months to check LFT. Please call to make an appointment with me after those labs are completed.You do not have evidence of hepatitis infection. It looks like you are immune to hepatitis B. I am assuming you have had the hepatitis b vaccine series in the past. Check your immunization records and let me know if this is true.   Vitamin d level is now normal.  Sincerely, Dr. Palacio

## 2018-07-05 ENCOUNTER — OFFICE VISIT (OUTPATIENT)
Dept: FAMILY MEDICINE CLINIC | Facility: CLINIC | Age: 28
End: 2018-07-05

## 2018-07-05 VITALS
HEIGHT: 66 IN | TEMPERATURE: 98 F | DIASTOLIC BLOOD PRESSURE: 80 MMHG | BODY MASS INDEX: 28.93 KG/M2 | SYSTOLIC BLOOD PRESSURE: 124 MMHG | RESPIRATION RATE: 16 BRPM | WEIGHT: 180 LBS | HEART RATE: 70 BPM

## 2018-07-05 DIAGNOSIS — R10.84 GENERALIZED ABDOMINAL PAIN: Primary | ICD-10-CM

## 2018-07-05 PROCEDURE — 99213 OFFICE O/P EST LOW 20 MIN: CPT | Performed by: FAMILY MEDICINE

## 2018-07-05 NOTE — PROGRESS NOTES
"Chief Complaint   Patient presents with   • Heartburn   • Diarrhea   • Vomiting   • GI Problem       Subjective   Pt is continuing with intermittent Nausea over the past year. He has pain and some diarrhea. He rarely has vomiting but did so on Tuesday. He has been dx with IBS and sees a GI specialist.   I have reviewed and updated his medications, medical history and problem list during today's office visit.     Patient Care Team:  Trae Palacio MD as PCP - General (Family Medicine)  Darlene Whitten MD as Consulting Physician (Colon and Rectal Surgery)  Viktor Post Jr., MD as Consulting Physician (Urology)  Keiry Martin MD as Consulting Physician (Gastroenterology)    Social History   Substance Use Topics   • Smoking status: Never Smoker   • Smokeless tobacco: Never Used   • Alcohol use Yes      Comment: rare       Review of Systems   Constitutional: Negative for unexpected weight gain.   Gastrointestinal: Positive for abdominal pain and diarrhea.        See HPI       Objective     /80   Pulse 70   Temp 98 °F (36.7 °C) (Oral)   Resp 16   Ht 167.6 cm (66\")   Wt 81.6 kg (180 lb)   BMI 29.05 kg/m²     Body mass index is 29.05 kg/m².    Physical Exam   Constitutional: No distress.   Abdominal: Normal appearance and bowel sounds are normal. There is no tenderness.        Data Reviewed:             Assessment/Plan     Problem List Items Addressed This Visit     None      Visit Diagnoses     Generalized abdominal pain    -  Primary    avoid dairy, see GI in consultation    Relevant Orders    Ambulatory Referral to Gastroenterology          Orders Placed This Encounter   Procedures   • Ambulatory Referral to Gastroenterology     Referral Priority:   Routine     Referral Type:   Consultation     Referral Reason:   Specialty Services Required     Referred to Provider:   Keiry Martin MD     Requested Specialty:   Gastroenterology     Number of Visits Requested:   1         Current Outpatient " Prescriptions:   •  Cholecalciferol (VITAMIN D) 1000 units tablet, Take 1 tablet by mouth Daily., Disp: 30 tablet, Rfl: 11  •  hyoscyamine (ANASPAZ,LEVSIN) 0.125 MG tablet, Take 1 tablet by mouth Every 6 (Six) Hours As Needed for Cramping or Diarrhea., Disp: 90 tablet, Rfl: 1  •  lidocaine (XYLOCAINE) 5 % ointment, Apply  topically 3 (Three) Times a Day., Disp: 50 g, Rfl: 0  •  pantoprazole (PROTONIX) 40 MG EC tablet, Take 1 tablet by mouth Daily., Disp: 60 tablet, Rfl: 1  •  valACYclovir (VALTREX) 1000 MG tablet, Take 2 tablets by mouth 2 (Two) Times a Day for 1 day., Disp: 4 tablet, Rfl: 5    Return if symptoms worsen or fail to improve.

## 2018-07-17 ENCOUNTER — RESULTS ENCOUNTER (OUTPATIENT)
Dept: FAMILY MEDICINE CLINIC | Facility: CLINIC | Age: 28
End: 2018-07-17

## 2018-07-17 DIAGNOSIS — R74.01 ELEVATED ALT MEASUREMENT: ICD-10-CM

## 2018-07-26 ENCOUNTER — OFFICE VISIT (OUTPATIENT)
Dept: GASTROENTEROLOGY | Facility: CLINIC | Age: 28
End: 2018-07-26

## 2018-07-26 VITALS
TEMPERATURE: 98.3 F | HEIGHT: 66 IN | BODY MASS INDEX: 28.51 KG/M2 | DIASTOLIC BLOOD PRESSURE: 78 MMHG | WEIGHT: 177.4 LBS | SYSTOLIC BLOOD PRESSURE: 122 MMHG

## 2018-07-26 DIAGNOSIS — K58.2 IRRITABLE BOWEL SYNDROME WITH BOTH CONSTIPATION AND DIARRHEA: ICD-10-CM

## 2018-07-26 DIAGNOSIS — R11.0 NAUSEA: ICD-10-CM

## 2018-07-26 DIAGNOSIS — D50.0 IRON DEFICIENCY ANEMIA DUE TO CHRONIC BLOOD LOSS: Primary | ICD-10-CM

## 2018-07-26 DIAGNOSIS — R74.8 ELEVATED LIVER ENZYMES: ICD-10-CM

## 2018-07-26 DIAGNOSIS — R10.13 DYSPEPSIA: ICD-10-CM

## 2018-07-26 PROCEDURE — 99214 OFFICE O/P EST MOD 30 MIN: CPT | Performed by: INTERNAL MEDICINE

## 2018-07-26 RX ORDER — CHOLESTYRAMINE 4 G/9G
4 POWDER, FOR SUSPENSION ORAL DAILY
Qty: 378 G | Refills: 2 | Status: SHIPPED | OUTPATIENT
Start: 2018-07-26 | End: 2018-10-22

## 2018-07-26 RX ORDER — ONDANSETRON 4 MG/1
4 TABLET, FILM COATED ORAL 2 TIMES DAILY
Qty: 60 TABLET | Refills: 1 | Status: SHIPPED | OUTPATIENT
Start: 2018-07-26 | End: 2018-10-22 | Stop reason: SDUPTHER

## 2018-07-26 NOTE — PATIENT INSTRUCTIONS
Schedule the gallbladder scan    Start the zofran twice a day    Cholestyramine for the diarrhea, adjust the dose as needed    For any additional questions, concerns or changes to your condition after today's office visit please contact the office at [phone].

## 2018-07-26 NOTE — PROGRESS NOTES
Chief Complaint   Patient presents with   • Irritable Bowel Syndrome     has gotten worse in the last month      Subjective     HPI  Hema Sweet is a 27 y.o. male who presents for follow up Of iron deficiency anemia, hemorrhoids, irritable bowel with alternating diarrhea and constipation.  He reports that his IBS symptoms have gotten worse.  For the past few months, they have been predominantly diarrhea.  He reports having loose urgent bowel movements following eating most meals.  This has caused him to not want to eat out of the house and to limit his diet quite a bit.  Tried Imodium, Levsin but this has not helped.  He remains on PPI.  He is having a lot of nausea when he has a diarrhea episodes.  At times it has lead to some vomiting.  He has had some subsequent weight loss of about 5 pounds due to his symptoms and food avoidance.  He is concerned that his symptoms may be related to his gallbladder.  He has not seen any blood in his stool.  In April, his iron stores and his blood counts had restored.    As of labs show a mild elevation in his transaminases.  His hepatitis panel was negative.  He has had CT imaging and liver ultrasound back a year ago, all which was normal.    Past Medical History:   Diagnosis Date   • Allergic rhinitis    • Anemia     IRON DEF, D/T BLOOD LOSS   • Bell's palsy    • Chronic diarrhea    • External hemorrhoids    • History of transfusion of packed red blood cells 2017   • Internal hemorrhoids    • Migraine    • Mononucleosis        Social History     Social History   • Marital status: Single     Social History Main Topics   • Smoking status: Never Smoker   • Smokeless tobacco: Never Used   • Alcohol use Yes      Comment: rare   • Drug use: No   • Sexual activity: Defer     Other Topics Concern   • Not on file         Current Outpatient Prescriptions:   •  Cholecalciferol (VITAMIN D) 1000 units tablet, Take 1 tablet by mouth Daily., Disp: 30 tablet, Rfl: 11  •  hyoscyamine  (ANASPAZ,LEVSIN) 0.125 MG tablet, Take 1 tablet by mouth Every 6 (Six) Hours As Needed for Cramping or Diarrhea., Disp: 90 tablet, Rfl: 1  •  lidocaine (XYLOCAINE) 5 % ointment, Apply  topically 3 (Three) Times a Day., Disp: 50 g, Rfl: 0  •  pantoprazole (PROTONIX) 40 MG EC tablet, Take 1 tablet by mouth Daily., Disp: 60 tablet, Rfl: 1  •  cholestyramine (QUESTRAN) 4 GM/DOSE powder, Take 1 packet by mouth Daily. Can adjust does as needed based on symptoms, Disp: 378 g, Rfl: 2  •  ondansetron (ZOFRAN) 4 MG tablet, Take 1 tablet by mouth 2 (Two) Times a Day., Disp: 60 tablet, Rfl: 1  •  valACYclovir (VALTREX) 1000 MG tablet, Take 2 tablets by mouth 2 (Two) Times a Day for 1 day., Disp: 4 tablet, Rfl: 5    Review of Systems   Constitutional: Negative for activity change, appetite change, chills and fever.   HENT: Negative for trouble swallowing.    Respiratory: Negative.    Cardiovascular: Negative.  Negative for chest pain.   Gastrointestinal: Positive for diarrhea and nausea. Negative for abdominal distention, abdominal pain, anal bleeding, constipation and vomiting.   Genitourinary: Negative for dysuria, frequency and hematuria.       Objective   Vitals:    07/26/18 1034   BP: 122/78   Temp: 98.3 °F (36.8 °C)     1    07/26/18  1034   Weight: 80.5 kg (177 lb 6.4 oz)     Body mass index is 28.63 kg/m².      Physical Exam   Constitutional: He is oriented to person, place, and time. He appears well-developed and well-nourished. No distress.   HENT:   Head: Normocephalic and atraumatic.   Right Ear: External ear normal.   Left Ear: External ear normal.   Nose: Nose normal.   Mouth/Throat: Oropharynx is clear and moist.   Eyes: Conjunctivae and EOM are normal. Right eye exhibits no discharge. Left eye exhibits no discharge. No scleral icterus.   Neck: Normal range of motion. Neck supple. No thyromegaly present.   No supraclavicular adenopathy   Cardiovascular: Normal rate, regular rhythm, normal heart sounds and intact  distal pulses.  Exam reveals no gallop.    No murmur heard.  No lower extremity edema   Pulmonary/Chest: Effort normal and breath sounds normal. No respiratory distress. He has no wheezes.   Abdominal: Soft. Normal appearance and bowel sounds are normal. He exhibits no distension and no mass. There is no hepatosplenomegaly. There is no tenderness. There is no rigidity, no rebound and no guarding. No hernia.   Genitourinary:   Genitourinary Comments: Rectal exam deferred   Musculoskeletal: Normal range of motion. He exhibits no edema or tenderness.   No atrophy of upper or lower extremities.  Normal digits and nails of both hands.   Lymphadenopathy:     He has no cervical adenopathy.   Neurological: He is alert and oriented to person, place, and time. He displays no atrophy. Coordination normal.   Skin: Skin is warm and dry. No rash noted. He is not diaphoretic. No erythema.   Psychiatric: He has a normal mood and affect. His behavior is normal. Judgment and thought content normal.   Vitals reviewed.      WBC   Date Value Ref Range Status   04/09/2018 8.51 4.50 - 10.70 10*3/mm3 Final   04/06/2017 8.21 4.50 - 10.70 10*3/mm3 Final     RBC   Date Value Ref Range Status   04/09/2018 5.03 4.60 - 6.00 10*6/mm3 Final   04/06/2017 5.02 4.60 - 6.00 10*6/mm3 Final     Hemoglobin   Date Value Ref Range Status   04/09/2018 14.6 13.7 - 17.6 g/dL Final   04/06/2017 10.9 (L) 13.7 - 17.6 g/dL Final     Hematocrit   Date Value Ref Range Status   04/09/2018 43.9 40.4 - 52.2 % Final   04/06/2017 35.1 (L) 40.4 - 52.2 % Final     MCV   Date Value Ref Range Status   04/09/2018 87.3 79.8 - 96.2 fL Final   04/06/2017 69.9 (L) 79.8 - 96.2 fL Final     MCH   Date Value Ref Range Status   04/09/2018 29.0 27.0 - 32.7 pg Final   04/06/2017 21.7 (L) 27.0 - 32.7 pg Final     MCHC   Date Value Ref Range Status   04/09/2018 33.3 32.6 - 36.4 g/dL Final   04/06/2017 31.1 (L) 32.6 - 36.4 g/dL Final     RDW   Date Value Ref Range Status   04/09/2018  13.5 11.5 - 14.5 % Final   04/06/2017  11.5 - 14.5 % Final     Comment:     NOT ABLE TO CALCULATE     RDW-SD   Date Value Ref Range Status   03/28/2017 67.0 (H) 37.0 - 54.0 fl Final     MPV   Date Value Ref Range Status   04/06/2017 9.8 6.0 - 12.0 fL Final     Platelets   Date Value Ref Range Status   04/09/2018 285 140 - 500 10*3/mm3 Final   04/06/2017 379 140 - 500 10*3/mm3 Final     Neutrophil %   Date Value Ref Range Status   04/06/2017 58.2 42.7 - 76.0 % Final     Neutrophil Rel %   Date Value Ref Range Status   04/09/2018 58.1 42.7 - 76.0 % Final     Lymphocyte %   Date Value Ref Range Status   04/06/2017 30.7 19.6 - 45.3 % Final     Lymphocyte Rel %   Date Value Ref Range Status   04/09/2018 31.1 19.6 - 45.3 % Final     Monocyte %   Date Value Ref Range Status   04/06/2017 9.6 5.0 - 12.0 % Final     Monocyte Rel %   Date Value Ref Range Status   04/09/2018 6.2 5.0 - 12.0 % Final     Eosinophil %   Date Value Ref Range Status   04/06/2017 0.7 0.3 - 6.2 % Final     Eosinophil Rel %   Date Value Ref Range Status   04/09/2018 3.4 0.3 - 6.2 % Final     Basophil %   Date Value Ref Range Status   04/06/2017 0.6 0.0 - 1.5 % Final     Basophil Rel %   Date Value Ref Range Status   04/09/2018 0.8 0.0 - 1.5 % Final     Immature Grans %   Date Value Ref Range Status   04/06/2017 0.2 0.0 - 0.5 % Final     Neutrophils, Absolute   Date Value Ref Range Status   04/06/2017 4.77 1.90 - 8.10 10*3/mm3 Final     Neutrophils Absolute   Date Value Ref Range Status   04/09/2018 4.94 1.90 - 8.10 10*3/mm3 Final     Lymphocytes, Absolute   Date Value Ref Range Status   04/06/2017 2.52 0.90 - 4.80 10*3/mm3 Final     Lymphocytes Absolute   Date Value Ref Range Status   04/09/2018 2.65 0.90 - 4.80 10*3/mm3 Final     Monocytes, Absolute   Date Value Ref Range Status   04/06/2017 0.79 0.20 - 1.20 10*3/mm3 Final     Monocytes Absolute   Date Value Ref Range Status   04/09/2018 0.53 0.20 - 1.20 10*3/mm3 Final     Eosinophils, Absolute   Date  Value Ref Range Status   04/06/2017 0.06 0.00 - 0.70 10*3/mm3 Final     Eosinophils Absolute   Date Value Ref Range Status   04/09/2018 0.29 0.00 - 0.70 10*3/mm3 Final     Basophils, Absolute   Date Value Ref Range Status   04/06/2017 0.05 0.00 - 0.20 10*3/mm3 Final     Basophils Absolute   Date Value Ref Range Status   04/09/2018 0.07 0.00 - 0.20 10*3/mm3 Final     Immature Grans, Absolute   Date Value Ref Range Status   04/06/2017 0.02 0.00 - 0.03 10*3/mm3 Final     nRBC   Date Value Ref Range Status   05/19/2017 0.0 0.0 - 0.0 /100 WBC Final   04/06/2017 0.0 0.0 - 0.0 /100 WBC Final       Lab Results   Component Value Date    GLUCOSE 90 04/06/2017    BUN 13 04/09/2018    CREATININE 0.93 04/09/2018    EGFRIFNONA 97 04/09/2018    EGFRIFAFRI 118 04/09/2018    BCR 14.0 04/09/2018    CO2 25.3 04/09/2018    CALCIUM 9.2 04/09/2018    PROTENTOTREF 7.5 06/14/2018    ALBUMIN 4.80 06/14/2018    LABIL2 1.8 04/09/2018    AST 29 06/14/2018    ALT 45 (H) 06/14/2018         Imaging Results (last 7 days)     ** No results found for the last 168 hours. **            Assessment/Plan    Irritable bowel syndrome with both constipation and diarrhea: Her only diarrhea predominant and quite burdensome to his life    Nausea, dyspepsia: ? functional versus biliary    Elevated liver enzymes: ? Biliary vs medication    Plan  Zofran twice a day for nausea  Cholestyramine for his diarrhea  HIDA scan for further evaluation of his nausea, dyspepsia and elevated liver enzymes  Maybe Viberzi vs amitriptyline in the future?    Hema was seen today for irritable bowel syndrome.    Diagnoses and all orders for this visit:    Iron deficiency anemia due to chronic blood loss    Irritable bowel syndrome with both constipation and diarrhea  -     NM HIDA Scan With Pharmacological Intervention; Future  -     ondansetron (ZOFRAN) 4 MG tablet; Take 1 tablet by mouth 2 (Two) Times a Day.  -     cholestyramine (QUESTRAN) 4 GM/DOSE powder; Take 1 packet by  mouth Daily. Can adjust does as needed based on symptoms    Nausea  -     NM HIDA Scan With Pharmacological Intervention; Future  -     ondansetron (ZOFRAN) 4 MG tablet; Take 1 tablet by mouth 2 (Two) Times a Day.    Dyspepsia  -     NM HIDA Scan With Pharmacological Intervention; Future  -     ondansetron (ZOFRAN) 4 MG tablet; Take 1 tablet by mouth 2 (Two) Times a Day.    Elevated liver enzymes        Dictated utilizing Dragon dictation

## 2018-07-31 ENCOUNTER — HOSPITAL ENCOUNTER (OUTPATIENT)
Dept: NUCLEAR MEDICINE | Facility: HOSPITAL | Age: 28
Discharge: HOME | End: 2018-07-31
Attending: INTERNAL MEDICINE

## 2018-07-31 DIAGNOSIS — R11.0 NAUSEA: ICD-10-CM

## 2018-07-31 DIAGNOSIS — K58.2 IRRITABLE BOWEL SYNDROME WITH BOTH CONSTIPATION AND DIARRHEA: ICD-10-CM

## 2018-07-31 DIAGNOSIS — R10.13 DYSPEPSIA: ICD-10-CM

## 2018-07-31 PROCEDURE — 0 TECHNETIUM TC 99M MEBROFENIN KIT: Performed by: INTERNAL MEDICINE

## 2018-07-31 PROCEDURE — 78227 HEPATOBIL SYST IMAGE W/DRUG: CPT

## 2018-07-31 PROCEDURE — 25010000002 SINCALIDE PER 5 MCG: Performed by: INTERNAL MEDICINE

## 2018-07-31 PROCEDURE — A9537 TC99M MEBROFENIN: HCPCS | Performed by: INTERNAL MEDICINE

## 2018-07-31 RX ORDER — MORPHINE SULFATE 10 MG/ML
3 INJECTION, SOLUTION INTRAMUSCULAR; INTRAVENOUS
Status: DISCONTINUED | OUTPATIENT
Start: 2018-07-31 | End: 2018-08-01 | Stop reason: HOSPADM

## 2018-07-31 RX ORDER — KIT FOR THE PREPARATION OF TECHNETIUM TC 99M MEBROFENIN 45 MG/10ML
1 INJECTION, POWDER, LYOPHILIZED, FOR SOLUTION INTRAVENOUS
Status: COMPLETED | OUTPATIENT
Start: 2018-07-31 | End: 2018-07-31

## 2018-07-31 RX ADMIN — SINCALIDE 1.6 MCG: 5 INJECTION, POWDER, LYOPHILIZED, FOR SOLUTION INTRAVENOUS at 14:38

## 2018-07-31 RX ADMIN — MEBROFENIN 1 DOSE: 45 INJECTION, POWDER, LYOPHILIZED, FOR SOLUTION INTRAVENOUS at 11:48

## 2018-08-29 ENCOUNTER — TELEPHONE (OUTPATIENT)
Dept: GASTROENTEROLOGY | Facility: CLINIC | Age: 28
End: 2018-08-29

## 2018-08-29 RX ORDER — AMITRIPTYLINE HYDROCHLORIDE 10 MG/1
10 TABLET, FILM COATED ORAL NIGHTLY
Qty: 30 TABLET | Refills: 1 | Status: SHIPPED | OUTPATIENT
Start: 2018-08-29 | End: 2018-10-22 | Stop reason: SDUPTHER

## 2018-08-29 NOTE — TELEPHONE ENCOUNTER
I have ordered amitriptyline for him to start-- this is to be taken before bed due to side effects of sleepiness.

## 2018-08-29 NOTE — TELEPHONE ENCOUNTER
Call to pt.  Advise per Dr Martin that HIDA showed a normal functioning gallbladder.    Pt verb understanding.  States symptoms are unchanged from o/v of 7/26.  Taking zofran BID.  Has upcoming appt with AISLINN Jurado 10/26.      Advise pt will update Dr Martin for any recommendations in the meantime.

## 2018-08-29 NOTE — TELEPHONE ENCOUNTER
----- Message from Keiry Martin MD sent at 8/6/2018  4:45 PM EDT -----  Please let him know that his HIDA scan showed a normal functioning gallbladder.

## 2018-08-30 NOTE — TELEPHONE ENCOUNTER
VM to pt - identifies as Klever Sweet.  Advise per DR Martin that amitriptyline has been ordered - take before bed due to side effects of sleepiness.  Contact office if any questions.

## 2018-10-22 ENCOUNTER — OFFICE VISIT (OUTPATIENT)
Dept: GASTROENTEROLOGY | Facility: CLINIC | Age: 28
End: 2018-10-22

## 2018-10-22 VITALS
HEIGHT: 66 IN | SYSTOLIC BLOOD PRESSURE: 118 MMHG | WEIGHT: 179 LBS | BODY MASS INDEX: 28.77 KG/M2 | DIASTOLIC BLOOD PRESSURE: 82 MMHG | TEMPERATURE: 98.8 F

## 2018-10-22 DIAGNOSIS — R11.0 NAUSEA: ICD-10-CM

## 2018-10-22 DIAGNOSIS — E55.9 VITAMIN D DEFICIENCY: ICD-10-CM

## 2018-10-22 DIAGNOSIS — K58.2 IRRITABLE BOWEL SYNDROME WITH BOTH CONSTIPATION AND DIARRHEA: Primary | ICD-10-CM

## 2018-10-22 DIAGNOSIS — R10.13 DYSPEPSIA: ICD-10-CM

## 2018-10-22 PROCEDURE — 99214 OFFICE O/P EST MOD 30 MIN: CPT | Performed by: NURSE PRACTITIONER

## 2018-10-22 RX ORDER — ONDANSETRON 4 MG/1
4 TABLET, FILM COATED ORAL 2 TIMES DAILY
Qty: 60 TABLET | Refills: 3 | Status: SHIPPED | OUTPATIENT
Start: 2018-10-22 | End: 2020-02-06 | Stop reason: SDUPTHER

## 2018-10-22 RX ORDER — HYOSCYAMINE SULFATE 0.125 MG
0.12 TABLET ORAL EVERY 6 HOURS PRN
Qty: 90 TABLET | Refills: 3 | Status: SHIPPED | OUTPATIENT
Start: 2018-10-22 | End: 2020-01-29 | Stop reason: SDUPTHER

## 2018-10-22 RX ORDER — CHOLESTYRAMINE 4 G/9G
4 POWDER, FOR SUSPENSION ORAL DAILY
Qty: 378 G | Refills: 2 | OUTPATIENT
Start: 2018-10-22 | End: 2019-05-29

## 2018-10-22 RX ORDER — AMITRIPTYLINE HYDROCHLORIDE 10 MG/1
10 TABLET, FILM COATED ORAL NIGHTLY
Qty: 90 TABLET | Refills: 1 | Status: SHIPPED | OUTPATIENT
Start: 2018-10-22 | End: 2020-02-06 | Stop reason: SDUPTHER

## 2018-10-22 NOTE — PROGRESS NOTES
Chief Complaint   Patient presents with   • Follow-up     IBS       Hema Sweet is a  28 y.o. male here for a follow up visit for IBS and dyspepsia.     HPI  29 yo m presents today for follow up visit for chronic IBS-mixed, dyspepsia and nausea. He is a patient of Dr. Martin. He was last seen in the office on 7/26/18. He has hx IBS-mixed with chronic dyspepsia, nausea and abd pain. He was recently started on Questran and Elavil. He thinks both meds are helping his symptoms. He admits his symptoms have gone from everyday to once or twice a week which he is very happy about. He denies any dysphagia, reflux, abd pain, N&V, diarrhea, constipation, rectal bleeding or melena. He admits his appetite is good and his weight is stable. HIDA scan was normal.     Past Medical History:   Diagnosis Date   • Allergic rhinitis    • Anemia     IRON DEF, D/T BLOOD LOSS   • Bell's palsy    • Chronic diarrhea    • External hemorrhoids    • History of transfusion of packed red blood cells 2017   • Internal hemorrhoids    • Irritable bowel syndrome    • Migraine    • Mononucleosis        Past Surgical History:   Procedure Laterality Date   • COLONOSCOPY N/A 3/23/2017    Perianal skin tag found, Single aphtha in the terminal ileum   • COLONOSCOPY  03/24/2017    Non-thrombosed external hemorrhoids, Internal hemorrhoids   • ENDOSCOPY N/A 3/24/2017    Z-line regular, 40 cm from the incisors   • HAND SURGERY     • HEMORRHOIDECTOMY N/A 3/26/2017    Procedure: HEMORRHOIDECTOMY;  Surgeon: Darlene Whitten MD;  Location: [facility] OR;  Service:    • SIGMOIDOSCOPY N/A 3/24/2017    Procedure: SIGMOIDOSCOPY FLEXIBLE into rectum;  Surgeon: Evan Coates MD;  Location: [facility] ENDOSCOPY;  Service:    • WISDOM TOOTH EXTRACTION         Scheduled Meds:    Continuous Infusions:  No current facility-administered medications for this visit.     PRN Meds:.    Allergies   Allergen Reactions   • Ceclor [Cefaclor]        Social History     Social  History   • Marital status:      Spouse name: N/A   • Number of children: N/A   • Years of education: N/A     Occupational History   • Not on file.     Social History Main Topics   • Smoking status: Never Smoker   • Smokeless tobacco: Never Used   • Alcohol use Yes      Comment: rare   • Drug use: No   • Sexual activity: Defer     Other Topics Concern   • Not on file     Social History Narrative   • No narrative on file       Family History   Problem Relation Age of Onset   • Diabetes Mother    • Hypertension Mother    • Cancer Maternal Grandmother    • Hypertension Maternal Grandmother    • Ulcerative colitis Maternal Grandmother    • Stroke Maternal Grandfather    • Lung disease Maternal Grandfather    • Diabetes Maternal Grandfather    • Ulcerative colitis Maternal Aunt    • Inflammatory bowel disease Maternal Aunt        Review of Systems   Constitutional: Negative for appetite change, chills, diaphoresis, fatigue, fever and unexpected weight change.   HENT: Negative for nosebleeds, postnasal drip, sore throat, trouble swallowing and voice change.    Respiratory: Negative for cough, choking, chest tightness, shortness of breath and wheezing.    Cardiovascular: Negative for chest pain.   Gastrointestinal: Negative for abdominal distention, abdominal pain, anal bleeding, blood in stool, constipation, diarrhea, nausea, rectal pain and vomiting.   Endocrine: Negative for polydipsia, polyphagia and polyuria.   Musculoskeletal: Negative for gait problem.   Skin: Negative for rash and wound.   Allergic/Immunologic: Negative for food allergies.   Neurological: Negative for dizziness, speech difficulty and light-headedness.   Psychiatric/Behavioral: Negative for confusion, self-injury, sleep disturbance and suicidal ideas.       Vitals:    10/22/18 0941   BP: 118/82   Temp: 98.8 °F (37.1 °C)       Physical Exam   Constitutional: He is oriented to person, place, and time. He appears well-developed and  well-nourished. He does not appear ill. No distress.   HENT:   Head: Normocephalic.   Eyes: Pupils are equal, round, and reactive to light.   Cardiovascular: Normal rate, regular rhythm and normal heart sounds.    Pulmonary/Chest: Effort normal and breath sounds normal.   Abdominal: Soft. Bowel sounds are normal. He exhibits no distension and no mass. There is no hepatosplenomegaly. There is no tenderness. There is no rebound and no guarding. No hernia.   Musculoskeletal: Normal range of motion.   Neurological: He is alert and oriented to person, place, and time.   Skin: Skin is warm and dry.   Psychiatric: He has a normal mood and affect. His speech is normal and behavior is normal. Judgment normal.       No images are attached to the encounter.    Assessment & plan     1. Irritable bowel syndrome with both constipation and diarrhea  - hyoscyamine (ANASPAZ,LEVSIN) 0.125 MG tablet; Take 1 tablet by mouth Every 6 (Six) Hours As Needed for Cramping or Diarrhea.  Dispense: 90 tablet; Refill: 3  - ondansetron (ZOFRAN) 4 MG tablet; Take 1 tablet by mouth 2 (Two) Times a Day.  Dispense: 60 tablet; Refill: 3  - cholestyramine (QUESTRAN) 4 GM/DOSE powder; Take 1 packet by mouth Daily. Can adjust does as needed based on symptoms  Dispense: 378 g; Refill: 2    2. Dyspepsia  - ondansetron (ZOFRAN) 4 MG tablet; Take 1 tablet by mouth 2 (Two) Times a Day.  Dispense: 60 tablet; Refill: 3    3. Vitamin D deficiency    4. Nausea  - ondansetron (ZOFRAN) 4 MG tablet; Take 1 tablet by mouth 2 (Two) Times a Day.  Dispense: 60 tablet; Refill: 3    HIDA scan was normal. He is having improvement in his symptoms with both the Questran and the elavil. Will refill both for 90 days since he will be losing his insurance with his new job change. His new insurance will not kick in until Feb 2019. Continue daily Vitamin D supplement. Call office with any issues. Follow up with Dr. Martin in 6 months.

## 2019-06-13 ENCOUNTER — PATIENT MESSAGE (OUTPATIENT)
Dept: FAMILY MEDICINE CLINIC | Facility: CLINIC | Age: 29
End: 2019-06-13

## 2019-06-18 NOTE — TELEPHONE ENCOUNTER
Alicia Álvarez 6/14/2019 8:47 AM EDT        ----- Message -----  From: Hema Sweet  Sent: 6/13/2019 3:28 PM  To: [facility]  Subject: Referral Request     ----- Message from Mychart, Generic sent at 6/13/2019 3:28 PM EDT -----    Hello,    I was wondering if you could tell me what I need to do for me to get my fertility checked? Would you need to refer me to a specialist? Or what is my next steps? My wife is wanting me to get checked out to see if my sperm are ok. Thank you and look forward to finding out what steps are next.

## 2019-07-17 ENCOUNTER — OFFICE VISIT (OUTPATIENT)
Dept: FAMILY MEDICINE CLINIC | Facility: CLINIC | Age: 29
End: 2019-07-17

## 2019-07-17 VITALS
BODY MASS INDEX: 29.25 KG/M2 | WEIGHT: 182 LBS | OXYGEN SATURATION: 98 % | RESPIRATION RATE: 16 BRPM | DIASTOLIC BLOOD PRESSURE: 72 MMHG | HEIGHT: 66 IN | SYSTOLIC BLOOD PRESSURE: 109 MMHG | HEART RATE: 79 BPM

## 2019-07-17 DIAGNOSIS — Z31.9 ENCOUNTER FOR INFERTILITY: Primary | ICD-10-CM

## 2019-07-17 DIAGNOSIS — I86.1 VARICOCELE: ICD-10-CM

## 2019-07-17 PROCEDURE — 99395 PREV VISIT EST AGE 18-39: CPT | Performed by: FAMILY MEDICINE

## 2019-07-17 NOTE — PROGRESS NOTES
"Rooming Tab(CC,VS,Pt Hx,Fall Screen)  Chief Complaint   Patient presents with   • Fertility Preservation     wants referral to fertility        Subjective   Patient is here because of ongoing fertility problems.  They have been trying to get pregnant for the past year without success.  I have reviewed and updated his medications, medical history and problem list during today's office visit.     Patient Care Team:  Trae Palacio MD as PCP - General (Family Medicine)  Darlene Whitten MD as Consulting Physician (Colon and Rectal Surgery)  Viktor Post Jr., MD as Consulting Physician (Urology)  Keiry Martin MD as Consulting Physician (Gastroenterology)    Problem List Tab  Medications Tab  Synopsis Tab  Chart Review Tab  Care Everywhere Tab  Immunizations Tab  Patient History Tab    Social History     Tobacco Use   • Smoking status: Never Smoker   • Smokeless tobacco: Never Used   Substance Use Topics   • Alcohol use: Yes     Comment: rare       Review of Systems   Constitutional: Negative for fatigue and unexpected weight loss.   Genitourinary: Negative for decreased libido, discharge, penile pain, erectile dysfunction, scrotal swelling and testicular pain.       Objective     Rooming Tab(CC,VS,Pt Hx,Fall Screen)  /72   Pulse 79   Resp 16   Ht 167.6 cm (65.98\")   Wt 82.6 kg (182 lb)   SpO2 98%   BMI 29.39 kg/m²     Body mass index is 29.39 kg/m².    Physical Exam   Constitutional: He appears well-developed. No distress.   Genitourinary: Testes normal and penis normal.   Genitourinary Comments: Small varicocele right side        Statin Decision Aid  Data Reviewed:                   Assessment/Plan   Order Review Tab  Health Maintenance Tab  Patient Plan/Order Tab  Diagnoses and all orders for this visit:    1. Encounter for infertility (Primary)  Comments:  If lab testing is normal, then patient's wife will make appointment for female infertility work-up.  If lab testing is abnormal referral to " urology.  Orders:  -     Semen Analysis - Semen, Voided; Future  -     Semen Analysis, Motility Count - Semen, Voided; Future  -     Fructose, Semen - Semen, Voided; Future    2. Varicocele-small, right sided        Wrapup Tab  Return if symptoms worsen or fail to improve.

## 2019-08-01 DIAGNOSIS — Z31.9 ENCOUNTER FOR INFERTILITY: ICD-10-CM

## 2019-08-06 LAB
BACTERIA [PRESENCE] IN SEMEN BY LIGHT MICROSCOPY: ABNORMAL /HPF
CHARACTER SMN: NORMAL
COLLECT TME SMN: ABNORMAL
DEBRIS SMN QL: ABNORMAL /HPF
EPI CELLS #/AREA SMN HPF: 1 /HPF
IMM GERM CELLS # SMN: 0 X10E6/ML
LIQUEFACTION TIME SMN: ABNORMAL MIN
PH SMN: ABNORMAL [PH]
RBC #/AREA SMN HPF: ABNORMAL /HPF
ROUND CELLS [#/VOLUME] IN SEMEN: 4.5 X10E6/ML
SERVICE CMNT-IMP: ABNORMAL
SEX ABSTIN DURATION TIME PATIENT: 96 HOURS
SPECIMEN VOL SMN: 3 ML
SPERM # SMN: 68.6 X10E6/ML
SPERM AGGLUTINATED SMN QL: ABNORMAL
SPERM IMMOTILE NFR SMN: 61 %
SPERM MOTILE NFR SMN: 39 %
SPERM NONPROG NFR SMN: 22 %
SPERM NORM NFR SMN: 6 %
SPERM PROG NFR SMN: 17 %
SPERMATOZOA AGGREGATED [PRESENCE] IN SEMEN: ABNORMAL
SPERMATOZOA MOTILE [#] IN SEMEN --POST EJACULATE: 80.6 X10E6
SPERMATOZOA NONPROGRESSIVE [#] IN SEMEN BY MICROSCOPY: 45.3 X10E6
SPERMATOZOA PROGRESSIVE [#] IN SEMEN BY AUTOMATED COUNT: 34.1 X10E6
SPERMATOZOA [#] IN SEMEN --POST EJACULATE: 205.7 X10E6
TIME RECEIVED IN LABORATORY OF SPECIMEN: ABNORMAL
TRICHOMONAS SP [PRESENCE] IN SEMEN BY LIGHT MICROSCOPY: ABNORMAL /HPF
VISC SMN QL: ABNORMAL
WBC # SMN: 4.5 X10E6/ML

## 2019-08-07 DIAGNOSIS — R86.9 SEMEN ABNORMALITY: Primary | ICD-10-CM

## 2019-09-30 RX ORDER — AMITRIPTYLINE HYDROCHLORIDE 10 MG/1
TABLET, FILM COATED ORAL
Qty: 90 TABLET | Refills: 1 | OUTPATIENT
Start: 2019-09-30

## 2019-12-27 DIAGNOSIS — Z23 NEED FOR PROPHYLACTIC VACCINATION AGAINST HAEMOPHILUS INFLUENZAE TYPE B: Primary | ICD-10-CM

## 2019-12-27 RX ORDER — OSELTAMIVIR PHOSPHATE 75 MG/1
75 CAPSULE ORAL DAILY
Qty: 7 CAPSULE | Refills: 0 | Status: SHIPPED | OUTPATIENT
Start: 2019-12-27 | End: 2021-04-02

## 2020-01-29 ENCOUNTER — OFFICE VISIT (OUTPATIENT)
Dept: GASTROENTEROLOGY | Facility: CLINIC | Age: 30
End: 2020-01-29

## 2020-01-29 VITALS
SYSTOLIC BLOOD PRESSURE: 114 MMHG | TEMPERATURE: 99.1 F | BODY MASS INDEX: 28.98 KG/M2 | HEIGHT: 66 IN | DIASTOLIC BLOOD PRESSURE: 68 MMHG | WEIGHT: 180.3 LBS

## 2020-01-29 DIAGNOSIS — K58.2 IRRITABLE BOWEL SYNDROME WITH BOTH CONSTIPATION AND DIARRHEA: ICD-10-CM

## 2020-01-29 DIAGNOSIS — R10.9 ABDOMINAL CRAMPING: Primary | ICD-10-CM

## 2020-01-29 DIAGNOSIS — R74.01 ELEVATED ALT MEASUREMENT: ICD-10-CM

## 2020-01-29 PROCEDURE — 99214 OFFICE O/P EST MOD 30 MIN: CPT | Performed by: INTERNAL MEDICINE

## 2020-01-29 RX ORDER — HYOSCYAMINE SULFATE 0.125 MG
0.12 TABLET ORAL EVERY 8 HOURS PRN
Qty: 90 TABLET | Refills: 3 | Status: SHIPPED | OUTPATIENT
Start: 2020-01-29 | End: 2021-03-26 | Stop reason: SDUPTHER

## 2020-01-29 NOTE — PROGRESS NOTES
Chief Complaint   Patient presents with   • Irritable Bowel Syndrome     Subjective     HPI  Hema Sweet is a 29 y.o. male who presents for follow-up.  He has been seen in the past for iron deficiency anemia secondary to severe hemorrhoids as well as irritable bowel syndrome with both diarrhea and constipation.  He has not been seen in the office in over a year.    He has transitioned to a new job.  He is out of all medicines.  Complains still of alternating diarrhea and constipation.  Having lots of lower abdominal cramping but this is primarily with his diarrhea episodes.  He did get some relief with amitriptyline and Zofran in the past.    No rectal bleeding.  No recent lab work    No nausea, vomiting.  Weight is stable    Past Medical History:   Diagnosis Date   • Allergic rhinitis    • Anemia     IRON DEF, D/T BLOOD LOSS   • Bell's palsy    • Chronic diarrhea    • External hemorrhoids    • History of transfusion of packed red blood cells 2017   • Internal hemorrhoids    • Irritable bowel syndrome    • Migraine    • Mononucleosis        Social History     Socioeconomic History   • Marital status:      Spouse name: Not on file   • Number of children: Not on file   • Years of education: Not on file   • Highest education level: Not on file   Tobacco Use   • Smoking status: Never Smoker   • Smokeless tobacco: Never Used   Substance and Sexual Activity   • Alcohol use: Yes     Comment: rare   • Drug use: No   • Sexual activity: Defer         Current Outpatient Medications:   •  amitriptyline (ELAVIL) 10 MG tablet, Take 1 tablet by mouth Every Night., Disp: 90 tablet, Rfl: 1  •  hyoscyamine (ANASPAZ,LEVSIN) 0.125 MG tablet, Take 1 tablet by mouth Every 8 (Eight) Hours As Needed for Cramping or Diarrhea., Disp: 90 tablet, Rfl: 3  •  ondansetron (ZOFRAN) 4 MG tablet, Take 1 tablet by mouth 2 (Two) Times a Day., Disp: 60 tablet, Rfl: 3  •  oseltamivir (TAMIFLU) 75 MG capsule, Take 1 capsule by mouth Daily.  Take 1 capsule by mouth 1x a day for 7 days, Disp: 7 capsule, Rfl: 0  •  rifAXIMin (XIFAXAN) 200 MG tablet, Take 1 tablet by mouth 3 (Three) Times a Day for 14 days., Disp: 42 tablet, Rfl: 0  •  valACYclovir (VALTREX) 1000 MG tablet, Take 2 tablets by mouth 2 (Two) Times a Day for 1 day., Disp: 4 tablet, Rfl: 5    Review of Systems   Constitutional: Negative for activity change, appetite change, chills and fever.   HENT: Negative for trouble swallowing.    Respiratory: Negative.    Cardiovascular: Negative.  Negative for chest pain.   Gastrointestinal: Positive for abdominal pain, constipation and diarrhea. Negative for abdominal distention, anal bleeding, nausea and vomiting.   Genitourinary: Negative for dysuria, frequency and hematuria.       Objective   Vitals:    01/29/20 1523   BP: 114/68   Temp: 99.1 °F (37.3 °C)         01/29/20  1523   Weight: 81.8 kg (180 lb 4.8 oz)     Body mass index is 29.12 kg/m².      Physical Exam   Constitutional: He is oriented to person, place, and time. He appears well-developed and well-nourished. No distress.   HENT:   Head: Normocephalic and atraumatic.   Right Ear: External ear normal.   Left Ear: External ear normal.   Nose: Nose normal.   Mouth/Throat: Oropharynx is clear and moist.   Eyes: Conjunctivae and EOM are normal. Right eye exhibits no discharge. Left eye exhibits no discharge. No scleral icterus.   Neck: Normal range of motion. Neck supple. No thyromegaly present.   No supraclavicular adenopathy   Cardiovascular: Normal rate, regular rhythm, normal heart sounds and intact distal pulses. Exam reveals no gallop.   No murmur heard.  No lower extremity edema   Pulmonary/Chest: Effort normal and breath sounds normal. No respiratory distress. He has no wheezes.   Abdominal: Soft. Normal appearance and bowel sounds are normal. He exhibits no distension and no mass. There is no hepatosplenomegaly. There is no tenderness. There is no rigidity, no rebound and no guarding.  No hernia.   Genitourinary:   Genitourinary Comments: Rectal exam deferred   Musculoskeletal: Normal range of motion. He exhibits no edema or tenderness.   No atrophy of upper or lower extremities.  Normal digits and nails of both hands.   Lymphadenopathy:     He has no cervical adenopathy.   Neurological: He is alert and oriented to person, place, and time. He displays no atrophy. Coordination normal.   Skin: Skin is warm and dry. No rash noted. He is not diaphoretic. No erythema.   Psychiatric: He has a normal mood and affect. His behavior is normal. Judgment and thought content normal.   Vitals reviewed.      WBC   Date Value Ref Range Status   04/09/2018 8.51 4.50 - 10.70 10*3/mm3 Final   04/06/2017 8.21 4.50 - 10.70 10*3/mm3 Final     RBC   Date Value Ref Range Status   04/09/2018 5.03 4.60 - 6.00 10*6/mm3 Final   04/06/2017 5.02 4.60 - 6.00 10*6/mm3 Final     Hemoglobin   Date Value Ref Range Status   04/09/2018 14.6 13.7 - 17.6 g/dL Final   04/06/2017 10.9 (L) 13.7 - 17.6 g/dL Final     Hematocrit   Date Value Ref Range Status   04/09/2018 43.9 40.4 - 52.2 % Final   04/06/2017 35.1 (L) 40.4 - 52.2 % Final     MCV   Date Value Ref Range Status   04/09/2018 87.3 79.8 - 96.2 fL Final   04/06/2017 69.9 (L) 79.8 - 96.2 fL Final     MCH   Date Value Ref Range Status   04/09/2018 29.0 27.0 - 32.7 pg Final   04/06/2017 21.7 (L) 27.0 - 32.7 pg Final     MCHC   Date Value Ref Range Status   04/09/2018 33.3 32.6 - 36.4 g/dL Final   04/06/2017 31.1 (L) 32.6 - 36.4 g/dL Final     RDW   Date Value Ref Range Status   04/09/2018 13.5 11.5 - 14.5 % Final   04/06/2017  11.5 - 14.5 % Final     Comment:     NOT ABLE TO CALCULATE     RDW-SD   Date Value Ref Range Status   03/28/2017 67.0 (H) 37.0 - 54.0 fl Final     MPV   Date Value Ref Range Status   04/06/2017 9.8 6.0 - 12.0 fL Final     Platelets   Date Value Ref Range Status   04/09/2018 285 140 - 500 10*3/mm3 Final   04/06/2017 379 140 - 500 10*3/mm3 Final     Neutrophil Rel  %   Date Value Ref Range Status   04/09/2018 58.1 42.7 - 76.0 % Final     Neutrophil %   Date Value Ref Range Status   04/06/2017 58.2 42.7 - 76.0 % Final     Lymphocyte Rel %   Date Value Ref Range Status   04/09/2018 31.1 19.6 - 45.3 % Final     Lymphocyte %   Date Value Ref Range Status   04/06/2017 30.7 19.6 - 45.3 % Final     Monocyte Rel %   Date Value Ref Range Status   04/09/2018 6.2 5.0 - 12.0 % Final     Monocyte %   Date Value Ref Range Status   04/06/2017 9.6 5.0 - 12.0 % Final     Eosinophil Rel %   Date Value Ref Range Status   04/09/2018 3.4 0.3 - 6.2 % Final     Eosinophil %   Date Value Ref Range Status   04/06/2017 0.7 0.3 - 6.2 % Final     Basophil Rel %   Date Value Ref Range Status   04/09/2018 0.8 0.0 - 1.5 % Final     Basophil %   Date Value Ref Range Status   04/06/2017 0.6 0.0 - 1.5 % Final     Immature Grans %   Date Value Ref Range Status   04/06/2017 0.2 0.0 - 0.5 % Final     Neutrophils Absolute   Date Value Ref Range Status   04/09/2018 4.94 1.90 - 8.10 10*3/mm3 Final     Neutrophils, Absolute   Date Value Ref Range Status   04/06/2017 4.77 1.90 - 8.10 10*3/mm3 Final     Lymphocytes Absolute   Date Value Ref Range Status   04/09/2018 2.65 0.90 - 4.80 10*3/mm3 Final     Lymphocytes, Absolute   Date Value Ref Range Status   04/06/2017 2.52 0.90 - 4.80 10*3/mm3 Final     Monocytes Absolute   Date Value Ref Range Status   04/09/2018 0.53 0.20 - 1.20 10*3/mm3 Final     Monocytes, Absolute   Date Value Ref Range Status   04/06/2017 0.79 0.20 - 1.20 10*3/mm3 Final     Eosinophils Absolute   Date Value Ref Range Status   04/09/2018 0.29 0.00 - 0.70 10*3/mm3 Final     Eosinophils, Absolute   Date Value Ref Range Status   04/06/2017 0.06 0.00 - 0.70 10*3/mm3 Final     Basophils Absolute   Date Value Ref Range Status   04/09/2018 0.07 0.00 - 0.20 10*3/mm3 Final     Basophils, Absolute   Date Value Ref Range Status   04/06/2017 0.05 0.00 - 0.20 10*3/mm3 Final     Immature Grans, Absolute   Date  Value Ref Range Status   04/06/2017 0.02 0.00 - 0.03 10*3/mm3 Final     nRBC   Date Value Ref Range Status   05/19/2017 0.0 0.0 - 0.0 /100 WBC Final   04/06/2017 0.0 0.0 - 0.0 /100 WBC Final       Lab Results   Component Value Date    GLUCOSE 90 04/06/2017    BUN 13 04/09/2018    CREATININE 0.93 04/09/2018    EGFRIFNONA 97 04/09/2018    EGFRIFAFRI 118 04/09/2018    BCR 14.0 04/09/2018    CO2 25.3 04/09/2018    CALCIUM 9.2 04/09/2018    PROTENTOTREF 7.5 06/14/2018    ALBUMIN 4.80 06/14/2018    LABIL2 1.8 04/09/2018    AST 29 06/14/2018    ALT 45 (H) 06/14/2018         Imaging Results (Last 7 Days)     ** No results found for the last 168 hours. **            Assessment/Plan    Irritable bowel syndrome: Mixed.  Persistent symptoms    Abdominal cramping: With above    Elevated ALT measurement: Last labs from 2018    Plan  CBC, CMP today  Daily fiber supplementation  Trial of rifaximin for IBS if his insurance will cover it  Hyoscyamine to 3 times a day as needed for abdominal cramping but only when he has diarrhea  MiraLAX up to 3 times a day as needed for constipation symptoms    Hmea was seen today for irritable bowel syndrome.    Diagnoses and all orders for this visit:    Abdominal cramping  -     CBC & Differential  -     Comprehensive Metabolic Panel    Irritable bowel syndrome with both constipation and diarrhea  -     rifAXIMin (XIFAXAN) 200 MG tablet; Take 1 tablet by mouth 3 (Three) Times a Day for 14 days.  -     CBC & Differential  -     Comprehensive Metabolic Panel  -     hyoscyamine (ANASPAZ,LEVSIN) 0.125 MG tablet; Take 1 tablet by mouth Every 8 (Eight) Hours As Needed for Cramping or Diarrhea.    Elevated ALT measurement        Dictated utilizing Dragon dictation

## 2020-01-30 ENCOUNTER — TELEPHONE (OUTPATIENT)
Dept: GASTROENTEROLOGY | Facility: CLINIC | Age: 30
End: 2020-01-30

## 2020-01-30 LAB
ALBUMIN SERPL-MCNC: 5.1 G/DL (ref 3.5–5.2)
ALBUMIN/GLOB SERPL: 2.1 G/DL
ALP SERPL-CCNC: 116 U/L (ref 39–117)
ALT SERPL-CCNC: 41 U/L (ref 1–41)
AST SERPL-CCNC: 24 U/L (ref 1–40)
BASOPHILS # BLD AUTO: 0.06 10*3/MM3 (ref 0–0.2)
BASOPHILS NFR BLD AUTO: 0.6 % (ref 0–1.5)
BILIRUB SERPL-MCNC: 0.5 MG/DL (ref 0.2–1.2)
BUN SERPL-MCNC: 11 MG/DL (ref 6–20)
BUN/CREAT SERPL: 11.8 (ref 7–25)
CALCIUM SERPL-MCNC: 10 MG/DL (ref 8.6–10.5)
CHLORIDE SERPL-SCNC: 98 MMOL/L (ref 98–107)
CO2 SERPL-SCNC: 27.7 MMOL/L (ref 22–29)
CREAT SERPL-MCNC: 0.93 MG/DL (ref 0.76–1.27)
EOSINOPHIL # BLD AUTO: 0.13 10*3/MM3 (ref 0–0.4)
EOSINOPHIL NFR BLD AUTO: 1.2 % (ref 0.3–6.2)
ERYTHROCYTE [DISTWIDTH] IN BLOOD BY AUTOMATED COUNT: 12.8 % (ref 12.3–15.4)
GLOBULIN SER CALC-MCNC: 2.4 GM/DL
GLUCOSE SERPL-MCNC: 107 MG/DL (ref 65–99)
HCT VFR BLD AUTO: 44.9 % (ref 37.5–51)
HGB BLD-MCNC: 15 G/DL (ref 13–17.7)
IMM GRANULOCYTES # BLD AUTO: 0.02 10*3/MM3 (ref 0–0.05)
IMM GRANULOCYTES NFR BLD AUTO: 0.2 % (ref 0–0.5)
LYMPHOCYTES # BLD AUTO: 2.56 10*3/MM3 (ref 0.7–3.1)
LYMPHOCYTES NFR BLD AUTO: 23.9 % (ref 19.6–45.3)
MCH RBC QN AUTO: 28.5 PG (ref 26.6–33)
MCHC RBC AUTO-ENTMCNC: 33.4 G/DL (ref 31.5–35.7)
MCV RBC AUTO: 85.2 FL (ref 79–97)
MONOCYTES # BLD AUTO: 0.7 10*3/MM3 (ref 0.1–0.9)
MONOCYTES NFR BLD AUTO: 6.5 % (ref 5–12)
NEUTROPHILS # BLD AUTO: 7.24 10*3/MM3 (ref 1.7–7)
NEUTROPHILS NFR BLD AUTO: 67.6 % (ref 42.7–76)
NRBC BLD AUTO-RTO: 0 /100 WBC (ref 0–0.2)
PLATELET # BLD AUTO: 332 10*3/MM3 (ref 140–450)
POTASSIUM SERPL-SCNC: 4.5 MMOL/L (ref 3.5–5.2)
PROT SERPL-MCNC: 7.5 G/DL (ref 6–8.5)
RBC # BLD AUTO: 5.27 10*6/MM3 (ref 4.14–5.8)
SODIUM SERPL-SCNC: 140 MMOL/L (ref 136–145)
WBC # BLD AUTO: 10.71 10*3/MM3 (ref 3.4–10.8)

## 2020-01-30 NOTE — TELEPHONE ENCOUNTER
Called pt and on identified vm advised per Dr Martin that all of his lab work looks good and stable . ADvised to call with questions.

## 2020-01-30 NOTE — TELEPHONE ENCOUNTER
----- Message from Keiry Martin MD sent at 1/30/2020  2:39 PM EST -----  All of his lab work looks good and stable.

## 2020-01-31 ENCOUNTER — PRIOR AUTHORIZATION (OUTPATIENT)
Dept: GASTROENTEROLOGY | Facility: CLINIC | Age: 30
End: 2020-01-31

## 2020-02-06 ENCOUNTER — TELEPHONE (OUTPATIENT)
Dept: GASTROENTEROLOGY | Facility: CLINIC | Age: 30
End: 2020-02-06

## 2020-02-06 DIAGNOSIS — R11.0 NAUSEA: ICD-10-CM

## 2020-02-06 DIAGNOSIS — R10.13 DYSPEPSIA: ICD-10-CM

## 2020-02-06 DIAGNOSIS — K58.2 IRRITABLE BOWEL SYNDROME WITH BOTH CONSTIPATION AND DIARRHEA: ICD-10-CM

## 2020-02-06 RX ORDER — AMITRIPTYLINE HYDROCHLORIDE 10 MG/1
10 TABLET, FILM COATED ORAL NIGHTLY
Qty: 90 TABLET | Refills: 0 | Status: SHIPPED | OUTPATIENT
Start: 2020-02-06 | End: 2020-06-04 | Stop reason: SDUPTHER

## 2020-02-06 RX ORDER — ONDANSETRON 4 MG/1
4 TABLET, FILM COATED ORAL 2 TIMES DAILY
Qty: 60 TABLET | Refills: 3 | Status: SHIPPED | OUTPATIENT
Start: 2020-02-06 | End: 2020-11-17 | Stop reason: SDUPTHER

## 2020-02-06 NOTE — TELEPHONE ENCOUNTER
----- Message from Hema Sweet sent at 2/6/2020  3:35 PM EST -----  Regarding: Prescription Question  Contact: [phone]  The new medicine xifaxan is very expensive even with insurance so I do not think I am going to be able to afford it. Is there a way just to go back with the amitriptyline? Also wanted to find out if we could get the zofran refilled as well?

## 2020-02-11 ENCOUNTER — TELEPHONE (OUTPATIENT)
Dept: GASTROENTEROLOGY | Facility: CLINIC | Age: 30
End: 2020-02-11

## 2020-02-11 NOTE — TELEPHONE ENCOUNTER
See note and rx's  of 2/6.      Call to pt.  Advise elavil and zofran have been filled.  Verb understanding.

## 2020-02-11 NOTE — TELEPHONE ENCOUNTER
----- Message from Mandy Huynh MA sent at 2/11/2020  1:50 PM EST -----  Contact: [phone]  Patient left a message 2/6 regarding medication, has not heard back.

## 2020-02-17 ENCOUNTER — PRIOR AUTHORIZATION (OUTPATIENT)
Dept: GASTROENTEROLOGY | Facility: CLINIC | Age: 30
End: 2020-02-17

## 2020-06-03 ENCOUNTER — TELEPHONE (OUTPATIENT)
Dept: GASTROENTEROLOGY | Facility: CLINIC | Age: 30
End: 2020-06-03

## 2020-06-03 NOTE — TELEPHONE ENCOUNTER
Faxed request received from [facility] for elavil 10 mg - take 1 tab by mouth daily, #90.     Never seen in this office.  Denial faxed to  - confirmation received.

## 2020-06-04 ENCOUNTER — TELEPHONE (OUTPATIENT)
Dept: GASTROENTEROLOGY | Facility: CLINIC | Age: 30
End: 2020-06-04

## 2020-06-04 RX ORDER — AMITRIPTYLINE HYDROCHLORIDE 10 MG/1
10 TABLET, FILM COATED ORAL NIGHTLY
Qty: 90 TABLET | Refills: 0 | Status: SHIPPED | OUTPATIENT
Start: 2020-06-04 | End: 2020-11-17 | Stop reason: SDUPTHER

## 2020-06-04 NOTE — TELEPHONE ENCOUNTER
Elavil escribed as ordered below.   
OK to refill  
Received fax request from [facility] for refill on Elavil 10mg take one po daily #90.  Message sent to Dr Martin.   
Walking/Standing

## 2020-07-27 DIAGNOSIS — B00.1 FEVER BLISTER: ICD-10-CM

## 2020-07-29 RX ORDER — VALACYCLOVIR HYDROCHLORIDE 1 G/1
2000 TABLET, FILM COATED ORAL 2 TIMES DAILY
Qty: 4 TABLET | Refills: 5 | OUTPATIENT
Start: 2020-07-29 | End: 2020-07-30

## 2020-11-17 DIAGNOSIS — K58.2 IRRITABLE BOWEL SYNDROME WITH BOTH CONSTIPATION AND DIARRHEA: ICD-10-CM

## 2020-11-17 DIAGNOSIS — R11.0 NAUSEA: ICD-10-CM

## 2020-11-17 DIAGNOSIS — R10.13 DYSPEPSIA: ICD-10-CM

## 2020-11-25 RX ORDER — AMITRIPTYLINE HYDROCHLORIDE 10 MG/1
10 TABLET, FILM COATED ORAL NIGHTLY
Qty: 90 TABLET | Refills: 0 | Status: SHIPPED | OUTPATIENT
Start: 2020-11-25 | End: 2021-02-24

## 2020-11-25 RX ORDER — ONDANSETRON 4 MG/1
4 TABLET, FILM COATED ORAL 2 TIMES DAILY
Qty: 60 TABLET | Refills: 3 | Status: SHIPPED | OUTPATIENT
Start: 2020-11-25 | End: 2022-07-21 | Stop reason: SDUPTHER

## 2020-11-30 ENCOUNTER — TELEPHONE (OUTPATIENT)
Dept: GASTROENTEROLOGY | Facility: CLINIC | Age: 30
End: 2020-11-30

## 2020-11-30 NOTE — TELEPHONE ENCOUNTER
----- Message from Hema Sweet sent at 11/24/2020  3:35 PM EST -----  Regarding: Prescription Question  Contact: [phone]  Hello,    I was wondering if there is anyway to get the amitriptyline refilled? Do I need to have a office visit to get it refilled?     Thank you so much and happy Thanksgiving

## 2021-01-13 ENCOUNTER — OFFICE VISIT (OUTPATIENT)
Dept: FAMILY MEDICINE CLINIC | Facility: CLINIC | Age: 31
End: 2021-01-13

## 2021-01-13 VITALS
DIASTOLIC BLOOD PRESSURE: 83 MMHG | BODY MASS INDEX: 32.95 KG/M2 | TEMPERATURE: 97.7 F | WEIGHT: 205 LBS | OXYGEN SATURATION: 98 % | RESPIRATION RATE: 16 BRPM | HEIGHT: 66 IN | SYSTOLIC BLOOD PRESSURE: 131 MMHG | HEART RATE: 88 BPM

## 2021-01-13 DIAGNOSIS — K64.4 EXTERNAL HEMORRHOID: Primary | ICD-10-CM

## 2021-01-13 PROBLEM — R53.83 OTHER FATIGUE: Status: RESOLVED | Noted: 2018-05-07 | Resolved: 2021-01-13

## 2021-01-13 PROBLEM — R11.0 NAUSEA: Status: RESOLVED | Noted: 2018-07-26 | Resolved: 2021-01-13

## 2021-01-13 PROBLEM — R10.9 ABDOMINAL CRAMPING: Status: RESOLVED | Noted: 2020-01-29 | Resolved: 2021-01-13

## 2021-01-13 PROBLEM — R10.13 DYSPEPSIA: Status: RESOLVED | Noted: 2018-07-26 | Resolved: 2021-01-13

## 2021-01-13 PROCEDURE — 99214 OFFICE O/P EST MOD 30 MIN: CPT | Performed by: FAMILY MEDICINE

## 2021-01-13 RX ORDER — ANASTROZOLE 1 MG/1
TABLET ORAL
COMMUNITY
Start: 2020-11-27

## 2021-01-13 RX ORDER — HYDROCORTISONE ACETATE PRAMOXINE HCL 2.5; 1 G/100G; G/100G
CREAM TOPICAL 3 TIMES DAILY
Qty: 30 G | Refills: 0 | Status: SHIPPED | OUTPATIENT
Start: 2021-01-13 | End: 2021-01-20

## 2021-01-13 RX ORDER — DOCUSATE SODIUM 100 MG/1
100 CAPSULE, LIQUID FILLED ORAL 2 TIMES DAILY
Qty: 60 CAPSULE | Refills: 0 | Status: SHIPPED | OUTPATIENT
Start: 2021-01-13 | End: 2021-02-12

## 2021-01-13 NOTE — PROGRESS NOTES
"Chief Complaint  Hemorrhoids (1 mo )    Subjective      History of Present Illness      Hema Sweet presents to [facility] for recurrent external hemorrhoid and pain.  No bleeding.  See assessment plan for discussion.    I have reviewed the ROS documented on chart during 01/13/2021 encounter.  Trae Palacio MD          Objective     Vital Signs:   /83   Pulse 88   Temp 97.7 °F (36.5 °C) (Tympanic)   Resp 16   Ht 167.6 cm (65.98\")   Wt 93 kg (205 lb)   SpO2 98%   BMI 33.11 kg/m²       Physical Exam  Constitutional:       General: He is not in acute distress.  Genitourinary:     Rectum: External hemorrhoid (non bleeding, non tender to touch. firm to palpation) present. No anal fissure.   Neurological:      Mental Status: He is alert.              Result Review :                        Assessment/Plan     Assessment and Plan      Diagnoses and all orders for this visit:    1. External hemorrhoid (Primary)  Assessment & Plan:  Recurrent problem.  Patient with history of hemorrhoidectomy in 2017.  Current issues for 1 to 2 months.  No current bleeding.  Patient using Preparation H and leftover lidocaine cream mixture.  Referral back to colorectal surgeon indicated.    Orders:  -     Ambulatory Referral to Colorectal Surgery  -     Hydrocort-Pramoxine, Perianal, (ANALPRAM-HC) 2.5-1 % rectal cream; Insert  into the rectum 3 (Three) Times a Day for 7 days.  Dispense: 30 g; Refill: 0  -     docusate sodium (Colace) 100 MG capsule; Take 1 capsule by mouth 2 (Two) Times a Day for 30 days.  Dispense: 60 capsule; Refill: 0          Follow Up   Return if symptoms worsen or fail to improve.    Patient was given instructions and counseling regarding his condition or for health maintenance advice. Please see specific information pulled into the AVS if appropriate.         "

## 2021-01-13 NOTE — ASSESSMENT & PLAN NOTE
Recurrent problem.  Patient with history of hemorrhoidectomy in 2017.  Current issues for 1 to 2 months.  No current bleeding.  Patient using Preparation H and leftover lidocaine cream mixture.  Referral back to colorectal surgeon indicated.

## 2021-02-24 RX ORDER — AMITRIPTYLINE HYDROCHLORIDE 10 MG/1
TABLET, FILM COATED ORAL
Qty: 90 TABLET | Refills: 0 | Status: SHIPPED | OUTPATIENT
Start: 2021-02-24 | End: 2021-03-26 | Stop reason: SDUPTHER

## 2021-02-26 NOTE — TELEPHONE ENCOUNTER
Called pt and advised of Dr Martin note. Verb understanding and f/u made for 03/26 at 485a with Malu HARRISON.

## 2021-03-26 ENCOUNTER — OFFICE VISIT (OUTPATIENT)
Dept: GASTROENTEROLOGY | Facility: CLINIC | Age: 31
End: 2021-03-26

## 2021-03-26 VITALS — BODY MASS INDEX: 32.47 KG/M2 | WEIGHT: 202 LBS | HEIGHT: 66 IN | TEMPERATURE: 98.2 F

## 2021-03-26 DIAGNOSIS — K58.2 IRRITABLE BOWEL SYNDROME WITH BOTH CONSTIPATION AND DIARRHEA: ICD-10-CM

## 2021-03-26 DIAGNOSIS — R10.30 LOWER ABDOMINAL PAIN: Primary | ICD-10-CM

## 2021-03-26 PROCEDURE — 99213 OFFICE O/P EST LOW 20 MIN: CPT | Performed by: NURSE PRACTITIONER

## 2021-03-26 RX ORDER — HYOSCYAMINE SULFATE 0.125 MG
0.12 TABLET ORAL EVERY 8 HOURS PRN
Qty: 90 TABLET | Refills: 3 | Status: SHIPPED | OUTPATIENT
Start: 2021-03-26 | End: 2022-07-21 | Stop reason: SDUPTHER

## 2021-03-26 RX ORDER — AMITRIPTYLINE HYDROCHLORIDE 10 MG/1
10 TABLET, FILM COATED ORAL NIGHTLY
Qty: 90 TABLET | Refills: 3 | Status: SHIPPED | OUTPATIENT
Start: 2021-03-26 | End: 2022-07-21 | Stop reason: SDUPTHER

## 2021-03-26 NOTE — PROGRESS NOTES
Chief Complaint   Patient presents with   • Irritable Bowel Syndrome       Hema Sweet is a  30 y.o. male here for a follow up visit for IBS.    HPI  30-year-old male presents today for follow-up visit for IBS-mixed.  He is a patient of Dr. Martin.  He was last seen in the office on 1/29/2020.  He is new to me today.  He has a history of IBS-mixed and up reports doing really well most days on Levsin as needed and nightly Elavil.  He was supposed to try Xifaxan at last visit but unfortunately his insurance would not cover it.  He is no longer taking fiber or MiraLAX.  He reports his bowels are moving so much better as long as he watches his diet.  He tells me he definitely has food triggers that he knows if he eats it like fried chicken he will flareup his IBS.  He does have a history of bleeding hemorrhoids and has been hospitalized for them in the past.  He follows up with Dr. Hermelinda Whitten for this.  He tells me his hemorrhoids are acting up and he plans to see her next week about possibly doing another hemorrhoidectomy.  He denies any dysphagia, reflux, nausea, vomiting, diarrhea, constipation, bleeding or melena.  Heme his appetite is good and his weight is stable.  His last colonoscopy was in 2017.  He had his hemorrhoidectomy in 2017 as well.  His last EGD was in 2017.  He did have a history at last visit of some elevated liver enzymes but those have since normalized.  Past Medical History:   Diagnosis Date   • Allergic rhinitis    • Anemia     IRON DEF, D/T BLOOD LOSS   • Bell's palsy    • Chronic diarrhea    • External hemorrhoids    • History of transfusion of packed red blood cells 2017   • Internal hemorrhoids    • Irritable bowel syndrome    • Migraine    • Mononucleosis        Past Surgical History:   Procedure Laterality Date   • COLONOSCOPY N/A 3/23/2017    Perianal skin tag found, Single aphtha in the terminal ileum   • COLONOSCOPY  03/24/2017    Non-thrombosed external hemorrhoids, Internal  hemorrhoids   • ENDOSCOPY N/A 3/24/2017    Z-line regular, 40 cm from the incisors   • HAND SURGERY     • HEMORRHOIDECTOMY N/A 3/26/2017    Procedure: HEMORRHOIDECTOMY;  Surgeon: Darlene Whitten MD;  Location: [facility] OR;  Service:    • SIGMOIDOSCOPY N/A 3/24/2017    Procedure: SIGMOIDOSCOPY FLEXIBLE into rectum;  Surgeon: Evan Coates MD;  Location: [facility] ENDOSCOPY;  Service:    • WISDOM TOOTH EXTRACTION         Scheduled Meds:    Continuous Infusions:No current facility-administered medications for this visit.      PRN Meds:.    Allergies   Allergen Reactions   • Ceclor [Cefaclor]        Social History     Socioeconomic History   • Marital status:      Spouse name: Not on file   • Number of children: Not on file   • Years of education: Not on file   • Highest education level: Not on file   Tobacco Use   • Smoking status: Never Smoker   • Smokeless tobacco: Never Used   Substance and Sexual Activity   • Alcohol use: Yes     Comment: rare   • Drug use: No   • Sexual activity: Defer       Family History   Problem Relation Age of Onset   • Diabetes Mother    • Hypertension Mother    • Cancer Maternal Grandmother    • Hypertension Maternal Grandmother    • Ulcerative colitis Maternal Grandmother    • Stroke Maternal Grandfather    • Lung disease Maternal Grandfather    • Diabetes Maternal Grandfather    • Ulcerative colitis Maternal Aunt    • Inflammatory bowel disease Maternal Aunt        Review of Systems   Constitutional: Negative for appetite change, chills, diaphoresis, fatigue, fever and unexpected weight change.   HENT: Negative for nosebleeds, postnasal drip, sore throat, trouble swallowing and voice change.    Respiratory: Negative for cough, choking, chest tightness, shortness of breath and wheezing.    Cardiovascular: Negative for chest pain, palpitations and leg swelling.   Gastrointestinal: Positive for rectal pain. Negative for abdominal distention, abdominal pain, anal bleeding,  blood in stool, constipation, diarrhea, nausea and vomiting.   Endocrine: Negative for polydipsia, polyphagia and polyuria.   Musculoskeletal: Negative for gait problem.   Skin: Negative for rash and wound.   Allergic/Immunologic: Negative for food allergies.   Neurological: Negative for dizziness, speech difficulty and light-headedness.   Psychiatric/Behavioral: Negative for confusion, self-injury, sleep disturbance and suicidal ideas.       Vitals:    03/26/21 0914   Temp: 98.2 °F (36.8 °C)       Physical Exam  Constitutional:       General: He is not in acute distress.     Appearance: He is well-developed. He is not ill-appearing.   HENT:      Head: Normocephalic.   Eyes:      Pupils: Pupils are equal, round, and reactive to light.   Cardiovascular:      Rate and Rhythm: Normal rate and regular rhythm.      Heart sounds: Normal heart sounds.   Pulmonary:      Effort: Pulmonary effort is normal.      Breath sounds: Normal breath sounds.   Abdominal:      General: Bowel sounds are normal. There is no distension.      Palpations: Abdomen is soft. There is no mass.      Tenderness: There is no abdominal tenderness. There is no guarding or rebound.      Hernia: No hernia is present.      Comments: Rectal exam deferred per patient.   Musculoskeletal:         General: Normal range of motion.   Skin:     General: Skin is warm and dry.   Neurological:      Mental Status: He is alert and oriented to person, place, and time.   Psychiatric:         Speech: Speech normal.         Behavior: Behavior normal.         Judgment: Judgment normal.         No radiology results for the last 7 days     Assessment and plan     1. Irritable bowel syndrome with both constipation and diarrhea  - hyoscyamine (ANASPAZ,LEVSIN) 0.125 MG tablet; Take 1 tablet by mouth Every 8 (Eight) Hours As Needed for Cramping or Diarrhea.  Dispense: 90 tablet; Refill: 3    2. Lower abdominal pain    IBS-mixed seems well controlled currently on Levsin as  needed as well as Elavil 10 mg at night.  I did recommend that he get back on the fiber I think with the problems that he is having with his hormones I think it makes sense but patient can discuss that further with Dr. Whitten when he sees her next week.  Overall think he is doing pretty well.  Patient to call the office with any issues.  Patient to follow-up with me in 1 year.  Patient is agreeable to the plan.

## 2021-04-12 ENCOUNTER — OFFICE VISIT (OUTPATIENT)
Dept: SURGERY | Facility: CLINIC | Age: 31
End: 2021-04-12

## 2021-04-12 VITALS
HEIGHT: 66 IN | BODY MASS INDEX: 32.99 KG/M2 | DIASTOLIC BLOOD PRESSURE: 80 MMHG | OXYGEN SATURATION: 98 % | SYSTOLIC BLOOD PRESSURE: 118 MMHG | HEART RATE: 78 BPM | TEMPERATURE: 97.7 F | WEIGHT: 205.3 LBS

## 2021-04-12 DIAGNOSIS — K60.2 FISSURE, ANAL: Primary | ICD-10-CM

## 2021-04-12 PROCEDURE — 99243 OFF/OP CNSLTJ NEW/EST LOW 30: CPT | Performed by: COLON & RECTAL SURGERY

## 2021-04-16 ENCOUNTER — BULK ORDERING (OUTPATIENT)
Dept: CASE MANAGEMENT | Facility: OTHER | Age: 31
End: 2021-04-16

## 2021-04-16 DIAGNOSIS — Z23 IMMUNIZATION DUE: ICD-10-CM

## 2021-06-16 DIAGNOSIS — B00.1 FEVER BLISTER: ICD-10-CM

## 2021-06-16 RX ORDER — VALACYCLOVIR HYDROCHLORIDE 1 G/1
2000 TABLET, FILM COATED ORAL 2 TIMES DAILY
Qty: 4 TABLET | Refills: 5 | OUTPATIENT
Start: 2021-06-16 | End: 2021-06-17

## 2022-05-17 RX ORDER — AMITRIPTYLINE HYDROCHLORIDE 10 MG/1
TABLET, FILM COATED ORAL
Qty: 90 TABLET | Refills: 0 | OUTPATIENT
Start: 2022-05-17

## 2022-07-21 ENCOUNTER — OFFICE VISIT (OUTPATIENT)
Dept: GASTROENTEROLOGY | Facility: CLINIC | Age: 32
End: 2022-07-21

## 2022-07-21 VITALS
DIASTOLIC BLOOD PRESSURE: 80 MMHG | SYSTOLIC BLOOD PRESSURE: 133 MMHG | BODY MASS INDEX: 32.79 KG/M2 | TEMPERATURE: 97.8 F | HEIGHT: 65 IN | WEIGHT: 196.8 LBS

## 2022-07-21 DIAGNOSIS — R11.0 NAUSEA: ICD-10-CM

## 2022-07-21 DIAGNOSIS — K64.8 OTHER HEMORRHOIDS: ICD-10-CM

## 2022-07-21 DIAGNOSIS — R10.13 DYSPEPSIA: ICD-10-CM

## 2022-07-21 DIAGNOSIS — R10.30 LOWER ABDOMINAL PAIN: ICD-10-CM

## 2022-07-21 DIAGNOSIS — K58.2 IRRITABLE BOWEL SYNDROME WITH BOTH CONSTIPATION AND DIARRHEA: Primary | ICD-10-CM

## 2022-07-21 PROCEDURE — 99214 OFFICE O/P EST MOD 30 MIN: CPT | Performed by: NURSE PRACTITIONER

## 2022-07-21 RX ORDER — HYOSCYAMINE SULFATE 0.125 MG
0.12 TABLET ORAL EVERY 8 HOURS PRN
Qty: 90 TABLET | Refills: 3 | Status: SHIPPED | OUTPATIENT
Start: 2022-07-21

## 2022-07-21 RX ORDER — ONDANSETRON 4 MG/1
4 TABLET, FILM COATED ORAL 2 TIMES DAILY
Qty: 60 TABLET | Refills: 3 | Status: SHIPPED | OUTPATIENT
Start: 2022-07-21

## 2022-07-21 RX ORDER — AMITRIPTYLINE HYDROCHLORIDE 10 MG/1
10 TABLET, FILM COATED ORAL NIGHTLY
Qty: 90 TABLET | Refills: 3 | Status: SHIPPED | OUTPATIENT
Start: 2022-07-21

## 2022-07-21 NOTE — PROGRESS NOTES
Chief Complaint   Patient presents with   • Diarrhea   • Abdominal Cramping       Hema Sweet is a  31 y.o. male here for a follow up visit for IBS.    HPI  31-year-old male presents today for follow-up visit for IBS-mixed.  He is a patient of Dr. Martin.  He was last seen in the office by me on 3/26/2021.  He has a history of IBS-mixed and normally does really well on Elavil every night and Levsin as needed.  Unfortunately tells me he has been crazy busy and stress lately with his wife expecting a baby in having some unforeseen complications.  And he ran out of his medications.  And has not been here sooner to get them refilled.  He did have follow-up with Dr. Francis after his hemorrhoids.  He tells me she cleared him and he does not have to go back to her currently.  Just as needed.  He does have history of elevated liver enzymes and most recent labs were normal.  Last EGD and colonoscopy was in 2017.  He tells me lately with all of his stress he has been having worsening reflux.  He has been taking omeprazole 20 mg daily.  He tells me it helps when he takes it.  He denies any dysphagia, vomiting, rectal bleeding or melena.  He admits his appetite is okay and his weight is stable. He does have a family history with multiple family members with ulcerative colitis.  Several of his family members have IBS.  He has been having worsening nausea lately which he is not sure is stress related and related to his reflux or not.  But it is definitely relieved with the Zofran.  Past Medical History:   Diagnosis Date   • Abdominal pain    • Allergic rhinitis    • Anemia     IRON DEF, D/T BLOOD LOSS   • Bell's palsy    • Change in bowel habits    • Chronic diarrhea    • Constipation    • Dyspepsia 05/2017   • Elevated ALT measurement 05/07/2018   • Fever blister 05/07/2018   • Hemorrhoids    • Hemorrhoids     FOLLOWED BY DR. NATHANIEL FRANCIS AT [facility].   • History of transfusion of packed red blood cells 2017   • Hypogonadism in  male     FOLLOWED BY DR. MICAELA VAUGHN   • Infection due to Haemophilus influenzae type B 12/27/2019   • Iron deficiency anemia due to chronic blood loss 03/23/2017   • Irritable bowel syndrome with both constipation and diarrhea 07/26/2018   • Migraine    • Mononucleosis    • Rhus dermatitis 05/29/2019    SEEN AT [facility]   • Semen abnormality    • Varicocele-small, right sided 07/17/2019   • Vitamin D deficiency 05/07/2018       Past Surgical History:   Procedure Laterality Date   • COLONOSCOPY N/A 03/23/2017    Perianal skin tag found, Single aphtha in the terminal ileum, DR. АННА NELSON AT [facility]   • COLONOSCOPY  03/24/2017    Non-thrombosed external hemorrhoids, Internal hemorrhoids   • ENDOSCOPY N/A 03/24/2017    Z-line regular, 40 cm from the incisors, DR. АННА NELSON AT [facility]   • HAND SURGERY  2003   • HEMORRHOIDECTOMY N/A 03/26/2017    Procedure: HEMORRHOIDECTOMY;  Surgeon: Darlene Whitten MD;  Location: [facility];  Service:    • SIGMOIDOSCOPY N/A 03/24/2017    EXTERNAL HEMORRHOIDS AND PARTIALLY PROLAPSED INTERNAL HEMORRHOIDS, DR.BRIAN NELSON AT [facility]   • WISDOM TOOTH EXTRACTION         Scheduled Meds:    Continuous Infusions:No current facility-administered medications for this visit.      PRN Meds:.    Allergies   Allergen Reactions   • Ceclor [Cefaclor]        Social History     Socioeconomic History   • Marital status:    Tobacco Use   • Smoking status: Never Smoker   • Smokeless tobacco: Never Used   Substance and Sexual Activity   • Alcohol use: Yes     Comment: rare   • Drug use: No   • Sexual activity: Yes     Partners: Female     Birth control/protection: None     Comment: .       Family History   Problem Relation Age of Onset   • Diabetes Mother    • Hypertension Mother    • Heart disease Mother    • Cancer Maternal Grandmother    • Hypertension Maternal Grandmother    • Ulcerative colitis Maternal Grandmother    • Stroke Maternal Grandfather    • Lung disease Maternal  Grandfather    • Diabetes Maternal Grandfather    • Ulcerative colitis Maternal Aunt    • Irritable bowel syndrome Maternal Aunt    • Inflammatory bowel disease Maternal Aunt        Review of Systems   Constitutional: Negative for appetite change, chills, diaphoresis, fatigue, fever and unexpected weight change.   HENT: Negative for nosebleeds, postnasal drip, sore throat, trouble swallowing and voice change.    Respiratory: Negative for cough, choking, chest tightness, shortness of breath, wheezing and stridor.    Cardiovascular: Negative for chest pain, palpitations and leg swelling.   Gastrointestinal: Positive for abdominal distention, abdominal pain, diarrhea and nausea. Negative for anal bleeding, blood in stool, constipation, rectal pain and vomiting.   Endocrine: Negative for polydipsia, polyphagia and polyuria.   Musculoskeletal: Negative for gait problem.   Skin: Negative for rash and wound.   Allergic/Immunologic: Negative for food allergies.   Neurological: Negative for dizziness, speech difficulty and light-headedness.   Psychiatric/Behavioral: Negative for confusion, self-injury, sleep disturbance and suicidal ideas.       Vitals:    07/21/22 1056   BP: 133/80   Temp: 97.8 °F (36.6 °C)       Physical Exam  Constitutional:       General: He is not in acute distress.     Appearance: He is well-developed. He is not ill-appearing.   HENT:      Head: Normocephalic.   Eyes:      Pupils: Pupils are equal, round, and reactive to light.   Cardiovascular:      Rate and Rhythm: Normal rate and regular rhythm.      Heart sounds: Normal heart sounds.   Pulmonary:      Effort: Pulmonary effort is normal.      Breath sounds: Normal breath sounds.   Abdominal:      General: Bowel sounds are normal. There is distension.      Palpations: Abdomen is soft. There is no mass.      Tenderness: There is no abdominal tenderness. There is no guarding or rebound.      Hernia: No hernia is present.   Musculoskeletal:          General: Normal range of motion.   Skin:     General: Skin is warm and dry.   Neurological:      Mental Status: He is alert and oriented to person, place, and time.   Psychiatric:         Speech: Speech normal.         Behavior: Behavior normal.         Judgment: Judgment normal.         No radiology results for the last 7 days     Diagnoses and all orders for this visit:    1. Irritable bowel syndrome with both constipation and diarrhea (Primary)  -     hyoscyamine (ANASPAZ,LEVSIN) 0.125 MG tablet; Take 1 tablet by mouth Every 8 (Eight) Hours As Needed for Cramping or Diarrhea.  Dispense: 90 tablet; Refill: 3  -     ondansetron (Zofran) 4 MG tablet; Take 1 tablet by mouth 2 (Two) Times a Day.  Dispense: 60 tablet; Refill: 3    2. Other hemorrhoids    3. Lower abdominal pain    4. Nausea  -     ondansetron (Zofran) 4 MG tablet; Take 1 tablet by mouth 2 (Two) Times a Day.  Dispense: 60 tablet; Refill: 3    5. Dyspepsia  -     ondansetron (Zofran) 4 MG tablet; Take 1 tablet by mouth 2 (Two) Times a Day.  Dispense: 60 tablet; Refill: 3    Other orders  -     amitriptyline (ELAVIL) 10 MG tablet; Take 1 tablet by mouth Every Night.  Dispense: 90 tablet; Refill: 3       IBS-mixed seems well controlled when he is on the Elavil at night and the Levsin as needed.  We will go ahead and refill these.  Recommend he continue omeprazole over-the-counter as needed for reflux symptoms.  Continue GERD precautions.  Will refill Zofran as needed for nausea.  He can increase the omeprazole to twice daily if he needs it.  Patient to call the office with any issues.  Patient to follow-up with me in 6 months.  Patient is agreeable to the plan.

## 2023-02-13 NOTE — ANESTHESIA POSTPROCEDURE EVALUATION
Patient: Hema Sweet    Procedure Summary     Date Anesthesia Start Anesthesia Stop Room / Location    03/24/17 1004 1034  [facility] /  [facility]       Procedure Diagnosis Surgeon Provider    ESOPHAGOGASTRODUODENOSCOPY with biopsies (N/A Esophagus); SIGMOIDOSCOPY FLEXIBLE into rectum (N/A ) Rectal bleeding; Iron deficiency anemia due to chronic blood loss  (Rectal bleeding [K62.5]; Iron deficiency anemia due to chronic blood loss [D50.0]) MD Brendan Gallo MD          Anesthesia Type: MAC  Last vitals  /67 (03/24/17 0937)    Temp 36.9 °C (98.4 °F) (03/24/17 0937)    Pulse 68 (03/24/17 0937)   Resp 16 (03/24/17 0937)    SpO2 96 % (03/24/17 0937)      Post Anesthesia Care and Evaluation    Patient location during evaluation: PACU  Patient participation: complete - patient participated  Level of consciousness: sleepy but conscious  Pain score: 0  Pain management: adequate  Airway patency: patent  Anesthetic complications: No anesthetic complications    Cardiovascular status: acceptable  Respiratory status: acceptable  Hydration status: acceptable       Ambulatory

## 2023-07-23 DIAGNOSIS — R11.0 NAUSEA: ICD-10-CM

## 2023-07-23 DIAGNOSIS — R10.13 DYSPEPSIA: ICD-10-CM

## 2023-07-23 DIAGNOSIS — K58.2 IRRITABLE BOWEL SYNDROME WITH BOTH CONSTIPATION AND DIARRHEA: ICD-10-CM

## 2023-07-24 RX ORDER — ONDANSETRON 4 MG/1
TABLET, FILM COATED ORAL
Qty: 9 TABLET | Refills: 0 | OUTPATIENT
Start: 2023-07-24

## 2023-09-27 NOTE — TELEPHONE ENCOUNTER
How Severe Is It?: mild Pt's message forwarded to Dr Martin.    Is This A New Presentation, Or A Follow-Up?: Follow Up Isotretinoin

## 2023-10-02 RX ORDER — AMITRIPTYLINE HYDROCHLORIDE 10 MG/1
TABLET, FILM COATED ORAL
Qty: 90 TABLET | Refills: 0 | OUTPATIENT
Start: 2023-10-02

## 2024-04-03 NOTE — TELEPHONE ENCOUNTER
----- Message from Keiry Martin MD sent at 4/5/2017  4:37 PM EDT -----  He is supposed to follow up with Darlene Whitten following hemorrhoid surgery. He has no appt yet.  Can we make sure he gets an appt with her in the next 1-2 weeks?  Thanks   No

## (undated) DEVICE — ANTIBACTERIAL UNDYED BRAIDED (POLYGLACTIN 910), SYNTHETIC ABSORBABLE SUTURE: Brand: COATED VICRYL

## (undated) DEVICE — BITEBLOCK OMNI BLOC

## (undated) DEVICE — GLV SURG SENSICARE GREEN W/ALOE PF LF 7 STRL

## (undated) DEVICE — LOU MINOR PROCEDURE: Brand: MEDLINE INDUSTRIES, INC.

## (undated) DEVICE — SPNG LAP 18X18IN LF STRL PK/5

## (undated) DEVICE — GOWN,PREVENTION PLUS,XLARGE,STERILE: Brand: MEDLINE

## (undated) DEVICE — NDL HYPO ECLPS SFTY 22G 1 1/2IN

## (undated) DEVICE — PREP SOL POVIDONE/IODINE BT 4OZ

## (undated) DEVICE — Device: Brand: DEFENDO AIR/WATER/SUCTION AND BIOPSY VALVE

## (undated) DEVICE — GLV SURG SENSICARE ALOE LF PF SZ7.5 GRN

## (undated) DEVICE — STERILE LATEX POWDER-FREE SURGICAL GLOVESWITH NITRILE COATING: Brand: PROTEXIS

## (undated) DEVICE — CONTAINER,SPECIMEN,OR STERILE,4OZ: Brand: MEDLINE

## (undated) DEVICE — PANTY KNIT MATERN L/XL

## (undated) DEVICE — FRCP BX RADJAW4 NDL 2.8 240CM LG OG BX40

## (undated) DEVICE — THE TORRENT IRRIGATION SCOPE CONNECTOR IS USED WITH THE TORRENT IRRIGATION TUBING TO PROVIDE IRRIGATION FLUIDS SUCH AS STERILE WATER DURING GASTROINTESTINAL ENDOSCOPIC PROCEDURES WHEN USED IN CONJUNCTION WITH AN IRRIGATION PUMP (OR ELECTROSURGICAL UNIT).: Brand: TORRENT

## (undated) DEVICE — DRAPE,UTILITY,TAPE,15X26,STERILE: Brand: MEDLINE

## (undated) DEVICE — ENSEAL TRIO TEMPERATURE CONTOLLED TISSUE SEALING TECHNOLOGY DISPOSABLE TISSUE SEALING DEVICE TAPTRONIC TRIGGER ACTIVATED POWER 3MM CURVED JAW: Brand: ENSEAL

## (undated) DEVICE — CANN NASL CO2 TRULINK W/O2 A/

## (undated) DEVICE — TUBING, SUCTION, 1/4" X 10', STRAIGHT: Brand: MEDLINE

## (undated) DEVICE — SPNG GZ WOVN 4X4IN 12PLY 10/BX STRL

## (undated) DEVICE — IRRIGATOR BULB ASEPTO 60CC STRL

## (undated) DEVICE — GLV SURG BIOGEL LTX PF 7

## (undated) DEVICE — SINGLE-USE BIOPSY FORCEPS: Brand: RADIAL JAW 4